# Patient Record
Sex: MALE | Race: WHITE | NOT HISPANIC OR LATINO | Employment: OTHER | URBAN - METROPOLITAN AREA
[De-identification: names, ages, dates, MRNs, and addresses within clinical notes are randomized per-mention and may not be internally consistent; named-entity substitution may affect disease eponyms.]

---

## 2017-02-07 ENCOUNTER — ANTICOAGULATION VISIT (OUTPATIENT)
Dept: CARDIOLOGY | Facility: CLINIC | Age: 72
End: 2017-02-07

## 2017-02-07 LAB — INR PPP: 2.3

## 2017-03-15 ENCOUNTER — ANTICOAGULATION VISIT (OUTPATIENT)
Dept: CARDIOLOGY | Facility: CLINIC | Age: 72
End: 2017-03-15

## 2017-03-15 LAB — INR PPP: 2.1

## 2017-03-17 ENCOUNTER — OFFICE VISIT (OUTPATIENT)
Dept: CARDIOLOGY | Facility: CLINIC | Age: 72
End: 2017-03-17

## 2017-03-17 VITALS
BODY MASS INDEX: 33.69 KG/M2 | WEIGHT: 271 LBS | DIASTOLIC BLOOD PRESSURE: 60 MMHG | HEART RATE: 50 BPM | SYSTOLIC BLOOD PRESSURE: 118 MMHG | HEIGHT: 75 IN

## 2017-03-17 DIAGNOSIS — I50.22 CHRONIC SYSTOLIC CONGESTIVE HEART FAILURE (HCC): ICD-10-CM

## 2017-03-17 DIAGNOSIS — I44.7 LBBB (LEFT BUNDLE BRANCH BLOCK): ICD-10-CM

## 2017-03-17 DIAGNOSIS — I42.9 CARDIOMYOPATHY (HCC): Primary | ICD-10-CM

## 2017-03-17 DIAGNOSIS — Z95.810 ICD (IMPLANTABLE CARDIOVERTER-DEFIBRILLATOR) IN PLACE: ICD-10-CM

## 2017-03-17 DIAGNOSIS — I48.0 PAROXYSMAL ATRIAL FIBRILLATION (HCC): ICD-10-CM

## 2017-03-17 PROCEDURE — 99213 OFFICE O/P EST LOW 20 MIN: CPT | Performed by: PHYSICIAN ASSISTANT

## 2017-03-17 PROCEDURE — 93289 INTERROG DEVICE EVAL HEART: CPT | Performed by: PHYSICIAN ASSISTANT

## 2017-03-17 NOTE — PROGRESS NOTES
Chief Complaint(s): CHF    History of Present Illness:    The acute uncomplicated chief complaint(s) first occurred in years ago,  is chronic in nature, moderate in severity, random in occurrence, happening all the time but controlled on medications, and has been medicated with BB, ARB, and diuretics, and manifest as a feeling of sob. It is worsened with exertion and is relieved with rest.  It is also occurring in the setting of concomitant edema.      Problem   Paroxysmal Atrial Fibrillation    · chadsvasc =4,Chronic coumadin  ·      Cardiomyopathy    -     Chronic Systolic Congestive Heart Failure    -     Icd (Implantable Cardioverter-Defibrillator) in Place    -            Current Outpatient Prescriptions:   •  aspirin 325 MG tablet, Take 325 mg by mouth daily., Disp: , Rfl:   •  carvedilol (COREG) 12.5 MG tablet, Take 12.5 mg by mouth 2 (two) times a day with meals., Disp: , Rfl:   •  digoxin (LANOXIN) 250 MCG tablet, Take 250 mcg by mouth every day., Disp: , Rfl:   •  fenofibrate (TRICOR) 145 MG tablet, Take 145 mg by mouth daily., Disp: , Rfl:   •  furosemide (LASIX) 20 MG tablet, Take 20 mg by mouth daily. Takes 1-2 tablets daily as needed, Disp: , Rfl:   •  gabapentin (NEURONTIN) 300 MG capsule, Take 300 mg by mouth daily., Disp: , Rfl:   •  GLYBURIDE PO, Take 8 mg by mouth daily., Disp: , Rfl:   •  insulin glargine (LANTUS) 100 UNIT/ML injection, Inject 86 Units under the skin daily., Disp: , Rfl:   •  levothyroxine (SYNTHROID, LEVOTHROID) 125 MCG tablet, Take 125 mcg by mouth daily., Disp: , Rfl:   •  losartan (COZAAR) 50 MG tablet, Take 50 mg by mouth daily., Disp: , Rfl:   •  SIMVASTATIN PO, Take 40 mg by mouth daily., Disp: , Rfl:   •  spironolactone (ALDACTONE) 25 MG tablet, Take 25 mg by mouth daily., Disp: , Rfl:   •  warfarin (COUMADIN) 0.25 MG quarter tablet, Take 1 mg by mouth daily. Take as directed, Disp: , Rfl:   No Known Allergies     PFSH:    Denies any alcohol, caffeine or tobacco  "abuse      ROS:    Cardiov: Denies chest pain, tightness, palpitations, GRECO, PND, or edema  Respiratory: Denies dyspnea, cough, hemoptysis, pleuritic chest pain, wheezing or sputum production    Visit Vitals   • /60 (BP Location: Left arm, Patient Position: Sitting)   • Pulse 50   • Ht 75\" (190.5 cm)   • Wt 271 lb (123 kg)   • BMI 33.87 kg/m2        Physical Exam   Pulmonary/Chest:            Lungs: CTA, no wheezing, equal air entry bilaterally, resonant to percussion  Cor: RRR, physiologic S1, S2, no rubs, gallops, murmurs, snaps, clicks, rubs or thrills, PMI non-displaced  Ext: warm, negative edema, all pulses normal     Diagnosis Plan   1. Cardiomyopathy  Continue coreg, losartan, lasix, spironolactone, digoxin   2. Chronic systolic congestive heart failure     3. ICD (implantable cardioverter-defibrillator) in place     4. LBBB (left bundle branch block)     5. Paroxysmal atrial fibrillation  Continue ASA if longer AF in future may need blood thinner          Device Check (BIVICD)    Company MDT  Mode DDD  Lower Rate 50 bpm  Upper rate 130 bpm         Thresholds    % pacing 5  Atrial Pacing 0.9 Volts @ 0.4 ms  Atrial Sensing 1.8 mV  Atrial Impedence 475 Ohms    % pacing 98  Right Ventricular Pacing 0.8 Volts @ 0.4 ms  Right Ventricular Sensing 9.8 mV  Right Ventricular Impedence 361 Ohms    % pacing 98  Left Ventricular Pacing 0.6 Volts @ 0.4 ms  Left Ventricular Sensing - mV  Left Ventricular Impedence 361 Ohms           Tachy Rx    VT1 171- 222 bpm  VT2 - - - bpm  VF > 222 bpm      Battery Voltage 2.91 Volts  Longevity 6.3  Charge Time 3.8 sec  Shock Impedence 47 Ohms    Episodes Short episodes of brief afib, less than twenty minutes    Reprogramming No changes    Comments Stable BIVICD    "

## 2017-04-26 ENCOUNTER — ANTICOAGULATION VISIT (OUTPATIENT)
Dept: CARDIOLOGY | Facility: CLINIC | Age: 72
End: 2017-04-26

## 2017-04-26 LAB — INR PPP: 2.2

## 2017-06-22 ENCOUNTER — ANTICOAGULATION VISIT (OUTPATIENT)
Dept: CARDIOLOGY | Facility: CLINIC | Age: 72
End: 2017-06-22

## 2017-06-22 LAB — INR PPP: 2

## 2017-06-22 NOTE — PATIENT INSTRUCTIONS
To recheck 2 weeks with no changes.  Wife says that she has been out of commission, broke her shoulder and then had an MI and that is why he has not been checked in 2 months.    AH

## 2017-08-01 ENCOUNTER — TELEPHONE (OUTPATIENT)
Dept: CARDIOLOGY | Facility: CLINIC | Age: 72
End: 2017-08-01

## 2017-08-11 ENCOUNTER — ANTICOAGULATION VISIT (OUTPATIENT)
Dept: CARDIOLOGY | Facility: CLINIC | Age: 72
End: 2017-08-11

## 2017-08-11 LAB — INR PPP: 2.4

## 2017-09-19 ENCOUNTER — ANTICOAGULATION VISIT (OUTPATIENT)
Dept: PHARMACY | Facility: HOSPITAL | Age: 72
End: 2017-09-19

## 2017-09-19 DIAGNOSIS — I48.0 PAROXYSMAL ATRIAL FIBRILLATION (HCC): ICD-10-CM

## 2017-09-19 LAB — INR PPP: 2.5

## 2017-09-19 NOTE — PROGRESS NOTES
Anticoagulation Clinic - Remote Progress Note  HOME MONITOR  Frequency of Monitoring: every 4 weeks    Indication: afib   Referring Provider: MARCEL Bryan  Goal INR: 2.0-3.0  Current Drug Interactions: aspirin, fenofibrate, glyburide, levothyroxine, spironolactone  CHADS-VASc: 3 (age, DM, CHF)    Diet: wife says a salad a week, green beans sometimes  Alcohol: none  Tobacco: none  OTC Pain Medication:     INR History:  Date 8/11 9/19          Total Weekly Dose 40mg 40mg          INR 2.4 2.5          Notes  1st of clinic            Phone Interview:  Tablet Strength: pt has 7.5mg and 5mg tablets  Current Maintenance Dose: 7.5 mg on Sun, Thu; 5 mg all other days  Patient Findings      Negatives Signs/symptoms of thrombosis, Signs/symptoms of bleeding, Laboratory test error suspected, Change in health, Change in alcohol use, Change in activity, Upcoming invasive procedure, Emergency department visit, Upcoming dental procedure, Missed doses, Extra doses, Change in medications, Change in diet/appetite, Hospital admission, Bruising, Other complaints         Patient Contact Info: [phone] **preferred**  Aleta(wife) is point of contact, her cell [phone]  Lab Contact Info: Joanna, [phone]    Plan:  1. INR is therapeutic (2.5). Instructed pt to continue maintenance dose of 7.5 mg on Sun, Thu; 5 mg all other days.  2. Repeat INR in 4 weeks.  3. Pt declines warfarin refills. Dr Jewel Phillip usually refills warfarin.  4. Verbal information provided over the phone to Aleta, pt's wife. She RBV dosing instructions, expresses understanding, and has no further questions at this time.    Keaton Perez  9/19/2017  12:07 PM      ISherrie, [facility], have reviewed the note in full and agree with the assessment and plan.  09/20/17  9:01 AM

## 2017-09-25 ENCOUNTER — TELEPHONE (OUTPATIENT)
Dept: CARDIOLOGY | Facility: CLINIC | Age: 72
End: 2017-09-25

## 2017-10-18 ENCOUNTER — TELEPHONE (OUTPATIENT)
Dept: CARDIOLOGY | Facility: CLINIC | Age: 72
End: 2017-10-18

## 2017-10-18 NOTE — TELEPHONE ENCOUNTER
Patient called to let us know that his PCP has been checking Digoxin levels on him for the past 2 weeks. He said that they have been too high and his PCP has decreased his dose b/c he was feeling bad. He said that his HR was in the 40's today. He also said that his blood sugar has been much better just being on a lower dose of Digoxin. He is going to schedule an appointment with the A-fib clinic to see if he needs his medications adjusted.

## 2017-11-06 ENCOUNTER — TELEPHONE (OUTPATIENT)
Dept: PHARMACY | Facility: HOSPITAL | Age: 72
End: 2017-11-06

## 2017-11-07 LAB — INR PPP: 2.1

## 2017-11-09 ENCOUNTER — DOCUMENTATION (OUTPATIENT)
Dept: CARDIOLOGY | Facility: CLINIC | Age: 72
End: 2017-11-09

## 2017-11-09 ENCOUNTER — ANTICOAGULATION VISIT (OUTPATIENT)
Dept: PHARMACY | Facility: HOSPITAL | Age: 72
End: 2017-11-09

## 2017-11-09 DIAGNOSIS — I48.0 PAROXYSMAL ATRIAL FIBRILLATION (HCC): ICD-10-CM

## 2017-11-09 NOTE — PROGRESS NOTES
Anticoagulation Clinic - Remote Progress Note  HOME MONITOR  Frequency of Monitoring: every 4 weeks    Indication: afib   Referring Provider: MARCEL Bryan  Goal INR: 2.0-3.0  Current Drug Interactions: aspirin, fenofibrate, glyburide, levothyroxine, spironolactone  CHADS-VASc: 3 (age, DM, CHF)    Diet: wife says a salad a week, green beans sometimes  Alcohol: none  Tobacco: none  OTC Pain Medication: APAP prn     INR History:  Date 8/11 9/19 11/7         Total Weekly Dose 40mg 40mg 40mg         INR 2.4 2.5 2.1         Notes  1st of clinic            Phone Interview:  Tablet Strength: pt has 7.5mg and 5mg tablets  Current Maintenance Dose: 5 mg daily except for 7.5mg on SunThurs  Patient Findings      Positives Change in medications     Negatives Signs/symptoms of thrombosis, Signs/symptoms of bleeding, Laboratory test error suspected, Change in health, Change in alcohol use, Change in activity, Upcoming invasive procedure, Emergency department visit, Upcoming dental procedure, Missed doses, Extra doses, Change in diet/appetite, Hospital admission, Bruising, Other complaints     Comments Wife reports pt is no longer taking digoxin     Patient Contact Info: [phone] **preferred**  Aleta (wife) is point of contact, her cell [phone]  Lab Contact Info: Joanna [phone]    Plan:  1. INR is therapeutic (2.1). Instructed pt to continue maintenance dose of 5 mg daily except for 7.5mg on SunThurs.  2. Repeat INR in 4 weeks.  3. Pt declines warfarin refills. Dr Jewel Phillip usually refills warfarin.  4. Verbal information provided over the phone to Aleta, pt's wife. She RBV dosing instructions, expresses understanding, and has no further questions at this time.    Keaton Perez, Ozzie  11/9/2017  8:34 AM       I, Suzie Stephenson, PharmD, assisted in the patient interview. I have reviewed the note in full and agree with the assessment and plan.  11/10/17  1:16 PM

## 2017-12-12 ENCOUNTER — TELEPHONE (OUTPATIENT)
Dept: PHARMACY | Facility: HOSPITAL | Age: 72
End: 2017-12-12

## 2017-12-13 ENCOUNTER — ANTICOAGULATION VISIT (OUTPATIENT)
Dept: PHARMACY | Facility: HOSPITAL | Age: 72
End: 2017-12-13

## 2017-12-13 DIAGNOSIS — I48.0 PAROXYSMAL ATRIAL FIBRILLATION (HCC): ICD-10-CM

## 2017-12-13 LAB — INR PPP: 2.3

## 2017-12-13 NOTE — PROGRESS NOTES
Anticoagulation Clinic - Remote Progress Note  HOME MONITOR  Frequency of Monitoring: every 4 weeks    Indication: afib   Referring Provider: MARCEL Bryan  Goal INR: 2.0-3.0  Current Drug Interactions: aspirin, fenofibrate, glyburide, levothyroxine, spironolactone  CHADS-VASc: 3 (age, DM, CHF)    Diet: wife says a salad a week, green beans sometimes  Alcohol: none  Tobacco: none  OTC Pain Medication: APAP prn     INR History:  Date 8/11 9/19 11/7 12/13        Total Weekly Dose 40mg 40mg 40mg 40mg 40mg       INR 2.4 2.5 2.1 2.3        Notes  1st of clinic            Phone Interview:  Tablet Strength: pt has 7.5mg and 5mg tablets  Current Maintenance Dose: 5mg daily except for 7.5mg on SunThurs    Patient Findings      Positives Emergency department visit     Negatives Signs/symptoms of thrombosis, Signs/symptoms of bleeding, Laboratory test error suspected, Change in health, Change in alcohol use, Change in activity, Upcoming invasive procedure, Upcoming dental procedure, Missed doses, Extra doses, Change in medications, Change in diet/appetite, Hospital admission, Bruising, Other complaints     Comments ED visit Mon 12/10 at  for CHF/shortness of breath. Wife said they just let him know what was going on and discharged him. No changes in meds otherwise     Patient Contact Info: [phone] **preferred**  Aleta (wife) is point of contact, her cell [phone]  Lab Contact Info: Joanna [phone]    Plan:  1. INR is therapeutic (2.3). Instructed pt to continue maintenance dose of 5mg daily except for 7.5mg on SunThurs.  2. Repeat INR in 4 weeks.  3. Pt declines warfarin refills. Dr Jewel Phillip usually refills warfarin.  4. Verbal information provided over the phone. Pt RBV dosing instructions, expresses understanding by teach back, and has no further questions at this time.    Keaton Perez, Ozzie  12/13/2017  2:37 PM     ISuzy, [facility], have reviewed the note in full and agree with the  assessment and plan.  12/14/17  8:29 AM

## 2018-01-28 ENCOUNTER — TELEPHONE (OUTPATIENT)
Dept: PHARMACY | Facility: HOSPITAL | Age: 73
End: 2018-01-28

## 2018-01-28 NOTE — TELEPHONE ENCOUNTER
Patient was due to repeat  INR on 1/10. Patient's wife stated that she would have him test today or tomorrow.

## 2018-01-31 ENCOUNTER — ANTICOAGULATION VISIT (OUTPATIENT)
Dept: PHARMACY | Facility: HOSPITAL | Age: 73
End: 2018-01-31

## 2018-01-31 ENCOUNTER — TELEPHONE (OUTPATIENT)
Dept: CARDIOLOGY | Facility: CLINIC | Age: 73
End: 2018-01-31

## 2018-01-31 DIAGNOSIS — I48.0 PAROXYSMAL ATRIAL FIBRILLATION (HCC): ICD-10-CM

## 2018-01-31 LAB — INR PPP: 2.5

## 2018-02-05 RX ORDER — SULINDAC 150 MG/1
150 TABLET ORAL AS NEEDED
COMMUNITY

## 2018-02-05 RX ORDER — ERGOCALCIFEROL 1.25 MG/1
50000 CAPSULE ORAL WEEKLY
COMMUNITY

## 2018-02-05 RX ORDER — HYDROCODONE BITARTRATE AND ACETAMINOPHEN 7.5; 325 MG/1; MG/1
1 TABLET ORAL 4 TIMES DAILY
COMMUNITY

## 2018-02-05 NOTE — PROGRESS NOTES
I, Suzie Stephenson, PharmD, have reviewed the note in full and agree with the assessment and plan.  02/05/18  8:57 AM

## 2018-03-06 ENCOUNTER — TELEPHONE (OUTPATIENT)
Dept: PHARMACY | Facility: HOSPITAL | Age: 73
End: 2018-03-06

## 2018-03-06 LAB — INR PPP: 2.1

## 2018-03-07 ENCOUNTER — ANTICOAGULATION VISIT (OUTPATIENT)
Dept: PHARMACY | Facility: HOSPITAL | Age: 73
End: 2018-03-07

## 2018-03-07 DIAGNOSIS — I48.0 PAROXYSMAL ATRIAL FIBRILLATION (HCC): ICD-10-CM

## 2018-03-07 NOTE — PROGRESS NOTES
Anticoagulation Clinic - Remote Progress Note  [facility] HOME MONITOR  Frequency of Monitoring: every 4 weeks    Indication: afib   Referring Provider: MARCEL Bryan  Goal INR: 2.0-3.0  Current Drug Interactions: aspirin, fenofibrate, glyburide, levothyroxine, spironolactone  CHADS-VASc: 3 (age, DM, CHF)    Diet: wife says a salad a week, green beans sometimes  Alcohol: none  Tobacco: none  OTC Pain Medication: APAP prn     INR History:  Date 8/11 9/19 11/7 12/13 1/31 3/6      Total Weekly Dose 40mg 40mg 40mg 40mg 40mg 40mg      INR 2.4 2.5 2.1 2.3 2.5 2.1      Notes  1st of clinic            Phone Interview:  Tablet Strength: pt has 7.5mg and 5mg tablets  Current Maintenance Dose: 5mg daily except for 7.5mg on SunThurs  Patient Contact Info: [phone] **preferred**  Aleta (wife) is point of contact, her cell [phone]  Patient Findings      Negatives Signs/symptoms of thrombosis, Signs/symptoms of bleeding, Laboratory test error suspected, Change in health, Change in alcohol use, Change in activity, Upcoming invasive procedure, Emergency department visit, Upcoming dental procedure, Missed doses, Extra doses, Change in medications, Change in diet/appetite, Hospital admission, Bruising, Other complaints     Plan:  1. INR was therapeutic on 3/6 at 2.1. Instructed patient's wife, Aleta, to have patient continue maintenance dose of warfarin 5mg daily except for 7.5mg on SunThurs.  2. Repeat INR in 3 weeks.  3. Pt declines warfarin refills. Dr Jewel Phillip usually refills warfarin.  4. Verbal information provided over the phone to patient's wife, Aleta, who RBV dosing instructions, expresses understanding by teach back, and has no further questions at this time.    Maribel Painting CPhT  3/7/2018  10:34 AM     ISherrie, [facility], have reviewed the note in full and agree with the assessment and plan.  03/07/18  1:07 PM

## 2018-04-16 ENCOUNTER — TELEPHONE (OUTPATIENT)
Dept: PHARMACY | Facility: HOSPITAL | Age: 73
End: 2018-04-16

## 2018-04-23 ENCOUNTER — ANTICOAGULATION VISIT (OUTPATIENT)
Dept: PHARMACY | Facility: HOSPITAL | Age: 73
End: 2018-04-23

## 2018-04-23 DIAGNOSIS — I48.0 PAROXYSMAL ATRIAL FIBRILLATION (HCC): ICD-10-CM

## 2018-04-23 LAB — INR PPP: 2.6

## 2018-04-23 NOTE — PROGRESS NOTES
Anticoagulation Clinic - Remote Progress Note  [facility] MONIT[facility]  Frequency of Monitoring: every 4 weeks    Indication: afib   Referring Provider: MARCEL Bryan  Goal INR: 2.0-3.0  Current Drug Interactions: aspirin, fenofibrate, glyburide, levothyroxine, spironolactone  CHADS-VASc: 3 (age, DM, CHF)    Diet: wife says a salad a week, green beans sometimes  Alcohol: none  Tobacco: none  OTC Pain Medication: APAP prn     INR History:  Date 8/11 9/19 11/7 12/13 1/31 3/6 4/23     Total Weekly Dose 40mg 40mg 40mg 40mg 40mg 40mg 40mg     INR 2.4 2.5 2.1 2.3 2.5 2.1 2.6     Notes  1st of clinic            Phone Interview:  Tablet Strength: pt has 7.5mg and 5mg tablets  Current Maintenance Dose: 5mg daily except for 7.5mg on SunThurs  Patient Contact Info: [phone] **preferred**  Aleta (wife) is point of contact, her cell [phone]    Patient Findings:   Negatives Signs/symptoms of thrombosis, Signs/symptoms of bleeding, Laboratory test error suspected, Change in health, Change in alcohol use, Change in activity, Upcoming invasive procedure, Emergency department visit, Upcoming dental procedure, Missed doses, Extra doses, Change in medications, Change in diet/appetite, Hospital admission, Bruising, Other complaints     Plan:  1. INR is therapeutic today at 2.6. Instructed patient's wife, Aleta, to have patient continue maintenance dose of warfarin 5mg daily except for 7.5mg on SunThurs.  2. Repeat INR in 4 weeks. (5/21)  3. Pt declines warfarin refills. Dr Jewel Phillip usually refills warfarin.  4. Verbal information provided over the phone to patient's wife, Aleta, who RBV dosing instructions, expresses understanding by teach back, and has no further questions at this time.    Maribel Painting CPhT  4/23/2018  1:36 PM     IKeaton [facility], have reviewed the note in full and agree with the assessment and plan.  04/23/18  1:52 PM

## 2018-04-23 NOTE — TELEPHONE ENCOUNTER
Spoke with patient about overdue PT/INR self test. He said he would take care of it immediately and call it into [facility]

## 2018-05-29 ENCOUNTER — TELEPHONE (OUTPATIENT)
Dept: PHARMACY | Facility: HOSPITAL | Age: 73
End: 2018-05-29

## 2018-05-31 ENCOUNTER — ANTICOAGULATION VISIT (OUTPATIENT)
Dept: PHARMACY | Facility: HOSPITAL | Age: 73
End: 2018-05-31

## 2018-05-31 DIAGNOSIS — I48.0 PAROXYSMAL ATRIAL FIBRILLATION (HCC): ICD-10-CM

## 2018-05-31 LAB — INR PPP: 2.7

## 2018-07-09 ENCOUNTER — TELEPHONE (OUTPATIENT)
Dept: CARDIOLOGY | Facility: CLINIC | Age: 73
End: 2018-07-09

## 2018-07-09 NOTE — TELEPHONE ENCOUNTER
Patient called and left a message. I tried to call him back. No answer. It would not let me leave a message.

## 2018-07-10 ENCOUNTER — ANTICOAGULATION VISIT (OUTPATIENT)
Dept: PHARMACY | Facility: HOSPITAL | Age: 73
End: 2018-07-10

## 2018-07-10 DIAGNOSIS — I48.0 PAROXYSMAL ATRIAL FIBRILLATION (HCC): ICD-10-CM

## 2018-07-10 LAB
INR PPP: 1.1
INR PPP: 1.1

## 2018-07-10 NOTE — PROGRESS NOTES
Anticoagulation Clinic - Remote Progress Note  [facility] MONIT[facility]  Frequency of Monitoring: every 4 weeks    Indication: afib   Referring Provider: MARCEL Bryan, Dr Holt  Goal INR: 2.0-3.0  Current Drug Interactions: aspirin, fenofibrate, glyburide, levothyroxine, spironolactone  CHADS-VASc: 3 (age, DM, CHF)    Diet: wife says a salad a week, green beans sometimes  Alcohol: none  Tobacco: none  OTC Pain Medication: APAP prn     INR History:  Date 8/11 9/19 11/7 12/13 1/31/18 3/6 4/23 5/31 7/10   Total Weekly Dose 40mg 40mg 40mg 40mg 40mg 40mg 40mg 40mg    INR 2.4 2.5 2.1 2.3 2.5 2.1 2.6 2.7 1.1   Notes  1st of clinic      missed 1x dose      Phone Interview:  Tablet Strength: 5mg and 7.5mg tablets  Current Maintenance Dose: 5mg daily except for 7.5mg on SunThurs  Patient Contact Info: [phone] **preferred**  Aleta (wife) is point of contact, her cell [phone]    Patient Findings   Positives:   Missed doses   Negatives:   Signs/symptoms of thrombosis, Signs/symptoms of bleeding, Laboratory test error suspected, Change in health, Change in alcohol use, Change in activity, Upcoming invasive procedure, Emergency department visit, Upcoming dental procedure, Extra doses, Change in medications, Change in diet/appetite, Hospital admission, Bruising, Other complaints   Comments:   Missed Tues or Wed last week. He had a tooth pull last Monday and reports that he has been bleeding from mouth. Suggested trying to put a tea bag on mouth.      Plan:  1. INR is SUB therapeutic today at 1.1. Patient had been due to check INR 6/12; however, have not had any INR reported to clinic since 5/31.        Instructed patient's wife, Aleta, to have patient take warfarin 7.5 mg daily until recheck on Friday 7/13. Will likely resume warfarin 5mg daily except for 7.5mg on SunThurs.   Note:  Dr. Jason (PCP [facility]) Wrote Lovenox Rx - however, patient refuses to take them at this time given bleeding from mouth. He voices  understanding of risks associated with SUBtherapeutic INR.   2. Repeat INR on 7/13.  3. Verbal information provided over the phone. Pt's wife, Aleta, RBV dosing instructions, expresses understanding by teach back, and has no further questions at this time.  4. Pt declines warfarin refills. Dr Jewel Phillip usually refills warfarin.    Ozzie Ahumada, PharmD  7/10/2018  10:34 AM    I, Suzie Stephenson, PharmD, have reviewed the note in full and agree with the assessment and plan.  07/10/18  12:38 PM

## 2018-07-13 ENCOUNTER — ANTICOAGULATION VISIT (OUTPATIENT)
Dept: PHARMACY | Facility: HOSPITAL | Age: 73
End: 2018-07-13

## 2018-07-13 DIAGNOSIS — I48.0 PAROXYSMAL ATRIAL FIBRILLATION (HCC): ICD-10-CM

## 2018-07-13 LAB — INR PPP: 1.5

## 2018-07-13 NOTE — PROGRESS NOTES
Anticoagulation Clinic - Remote Progress Note  [facility]  Frequency of Monitoring: every 4 weeks    Indication: afib   Referring Provider: MARCEL Bryan, Dr Holt  Goal INR: 2.0-3.0  Current Drug Interactions: aspirin, fenofibrate, glyburide, levothyroxine, spironolactone  CHADS-VASc: 3 (age, DM, CHF)    Diet: wife says a salad a week, green beans sometimes  Alcohol: none  Tobacco: none  OTC Pain Medication: APAP prn     INR History:  Date 8/11 9/19 11/7 12/13 1/31/18 3/6 4/23 5/31 7/10   Total Weekly Dose 40mg 40mg 40mg 40mg 40mg 40mg 40mg 40mg 15mg??   INR 2.4 2.5 2.1 2.3 2.5 2.1 2.6 2.7 1.1   Notes  1st of clinic      missed 1x dose amox     Date 7/13           Total Weekly Dose 27.5mg 47.5 mg          INR 1.5           Notes amox             Phone Interview:  Tablet Strength: 5mg and 7.5mg tablets  Current Maintenance Dose: 5mg daily except for 7.5mg on SunThurs  Patient Contact Info: [phone] **preferred**  Aleta (wife) is point of contact, her cell [phone]    Patient Findings   Positives:   Change in medications   Negatives:   Signs/symptoms of thrombosis, Signs/symptoms of bleeding, Laboratory test error suspected, Change in health, Change in alcohol use, Change in activity, Upcoming invasive procedure, Emergency department visit, Upcoming dental procedure, Missed doses, Extra doses, Change in diet/appetite, Hospital admission, Bruising, Other complaints   Comments:   Patient had teeth pulled on 7/2. He held warfarin perioperatively and was using Lovenox shots (prescribed by Dr. Jason PCP in [facility]). He developed bleeding in his mouth and subsequently stopped the Lovenox shots on his own. Pt's wife reports he has not been using the Lovenox shots and the bleeding in his mouth has resolved.     Patient was prescribed amoxicillin 500 mg PO BID at the time his teeth were extracted. Pt's wife reports he has one more day of amoxicillin course.      Plan:  1. INR is SUBtherapeutic again  today at 1.5, up from 1.1 on 7/10. INR is subtherapeutic due to held warfarin doses for teeth extraction. Instructed patient's wife, Aleta, to have patient BOOST with warfarin 7.5 mg tonight, then continue warfarin 5mg daily except for 7.5mg on SunThurs (targeting 47.5mg/week, 18% increase in dose). Will likely return to maintenance dose of 5mg daily except for 7.5mg on SunThurs at next INR check as patient has been therapeutic on this dose in the past.    2. Repeat INR on 7/16.  3. Verbal information provided over the phone. Pt's wife, Aleta, RBV dosing instructions, expresses understanding by teach back, and has no further questions at this time.  4. Pt declines warfarin refills. Dr Jewel Phillip usually refills warfarin.    Suzy Kaiser, PharmD  Pharmacy Resident  7/13/2018  2:54 PM

## 2018-07-17 ENCOUNTER — ANTICOAGULATION VISIT (OUTPATIENT)
Dept: PHARMACY | Facility: HOSPITAL | Age: 73
End: 2018-07-17

## 2018-07-17 DIAGNOSIS — I48.0 PAROXYSMAL ATRIAL FIBRILLATION (HCC): ICD-10-CM

## 2018-07-17 LAB — INR PPP: 2.3

## 2018-07-17 NOTE — PROGRESS NOTES
Anticoagulation Clinic - Remote Progress Note  [facility]  Frequency of Monitoring: every 4 weeks    Indication: paroxysmal afib   Referring Provider: MARCEL Bryan, Dr Holt  Goal INR: 2.0-3.0  Current Drug Interactions: aspirin, fenofibrate, glyburide, levothyroxine, spironolactone  CHADS-VASc: 3 (age, DM, CHF)    Diet: wife says a salad a week, green beans sometimes  Alcohol: none  Tobacco: none  OTC Pain Medication: APAP prn     INR History:  Date 8/11 9/19 11/7 12/13 1/31/18 3/6 4/23 5/31 7/10   Total Weekly Dose 40mg 40mg 40mg 40mg 40mg 40mg 40mg 40mg 15mg??   INR 2.4 2.5 2.1 2.3 2.5 2.1 2.6 2.7 1.1   Notes  1st of clinic      missed 1x dose amox     Date 7/13 7/17          Total Weekly Dose 27.5mg 47.5 mg          INR 1.5 2.3          Notes amox boost            Phone Interview:  Tablet Strength: 5mg and 7.5mg tablets  Current Maintenance Dose: 5mg daily except for 7.5mg on SunThurs  Patient Contact Info: [phone] **preferred**  Aleta (wife) is point of contact, her cell [phone]    Patient Findings   Negatives:   Signs/symptoms of thrombosis, Signs/symptoms of bleeding, Laboratory test error suspected, Change in health, Change in alcohol use, Change in activity, Upcoming invasive procedure, Emergency department visit, Upcoming dental procedure, Missed doses, Extra doses, Change in medications, Change in diet/appetite, Hospital admission, Bruising, Other complaints     Plan:  1. INR is therapeutic and back WNL today at 2.3. Instructed patient's wife to have patient to continue warfarin 5mg daily except for 7.5mg on SunThurs  2. Repeat INR in 1 week, 7/24 to make sure patient stays WNL  3. Verbal information provided over the phone. Pt's wife, Aleta, RBV dosing instructions, expresses understanding by teach back, and has no further questions at this time.  4. Pt declines warfarin refills. Dr Jewel Phillip usually refills warfarin.    Keaton Perez, Ozzie  7/17/2018  12:05 PM     I  Sylvie Kaiser, [facility], have reviewed the note in full and agree with the assessment and plan.  07/17/18  1:10 PM

## 2018-07-23 ENCOUNTER — ANTICOAGULATION VISIT (OUTPATIENT)
Dept: PHARMACY | Facility: HOSPITAL | Age: 73
End: 2018-07-23

## 2018-07-23 DIAGNOSIS — I48.0 PAROXYSMAL ATRIAL FIBRILLATION (HCC): ICD-10-CM

## 2018-07-23 LAB — INR PPP: 2.8

## 2018-07-23 NOTE — PROGRESS NOTES
Anticoagulation Clinic - Remote Progress Note  [facility]  Frequency of Monitoring: every 4 weeks    Indication: paroxysmal afib   Referring Provider: MARCEL Bryan, Dr Holt  Goal INR: 2.0-3.0  Current Drug Interactions: aspirin, fenofibrate, glyburide, levothyroxine, spironolactone  CHADS-VASc: 3 (age, DM, CHF)    Diet: wife says a salad a week, green beans sometimes  Alcohol: none  Tobacco: none  OTC Pain Medication: APAP prn     INR History:  Date 8/11 9/19 11/7 12/13 1/31/18 3/6 4/23 5/31 7/10   Total Weekly Dose 40mg 40mg 40mg 40mg 40mg 40mg 40mg 40mg 15mg??   INR 2.4 2.5 2.1 2.3 2.5 2.1 2.6 2.7 1.1   Notes  1st of clinic      missed 1x dose amox     Date 7/13 7/17 7/23         Total Weekly Dose 27.5mg 47.5 mg 35 mg         INR 1.5 2.3 2.8         Notes amox boost            Phone Interview:  Tablet Strength: 5mg and 7.5mg tablets  Current Maintenance Dose: 5mg daily except for 7.5mg on SunThurs  Patient Contact Info: [phone] **preferred**  Aleta (wife) is point of contact, her cell [phone]    Patient Findings:  Positives:   Missed doses, Bruising   Negatives:   Signs/symptoms of thrombosis, Signs/symptoms of bleeding, Laboratory test error suspected, Change in health, Change in alcohol use, Change in activity, Upcoming invasive procedure, Emergency department visit, Upcoming dental procedure, Extra doses, Change in medications, Change in diet/appetite, Hospital admission, Other complaints   Comments:   Patient has been having lots of bruising per patient spouse. They were concerned about this and only took 2.5mg last night instead of the scheduled 7.5mg     Plan:  1. INR is therapeutic today at 2.8. Instructed patient's wife to have patient continue warfarin 5mg daily except for 7.5mg on SunThurs.  2. Repeat INR on Thursday per Aleta's preference (7/26).  Will evaluate need for dose adjustment at that time. (12.5% self-reduction in dose last week)  3. Verbal information provided over  the phone. Patient's wife, Aleta, RBV dosing instructions, expresses understanding by teach back, and has no further questions at this time.  4. Patient declines warfarin refills. Dr. Jewel Phillip usually refills warfarin.    Jennie Parekh, Ozzie  7/23/2018  2:23 PM     I, Yomaira Briscoe, CelestinaD, have reviewed the note in full and agree with the assessment and plan.  07/23/18  3:17 PM

## 2018-07-27 ENCOUNTER — ANTICOAGULATION VISIT (OUTPATIENT)
Dept: PHARMACY | Facility: HOSPITAL | Age: 73
End: 2018-07-27

## 2018-07-27 DIAGNOSIS — I48.0 PAROXYSMAL ATRIAL FIBRILLATION (HCC): ICD-10-CM

## 2018-07-27 LAB — INR PPP: 2.3

## 2018-08-27 ENCOUNTER — ANTICOAGULATION VISIT (OUTPATIENT)
Dept: PHARMACY | Facility: HOSPITAL | Age: 73
End: 2018-08-27

## 2018-08-27 DIAGNOSIS — I48.0 PAROXYSMAL ATRIAL FIBRILLATION (HCC): ICD-10-CM

## 2018-08-27 LAB — INR PPP: 3.2

## 2018-08-27 NOTE — PROGRESS NOTES
Anticoagulation Clinic - Remote Progress Note  [facility]  Frequency of Monitoring: every 4 weeks    Indication: paroxysmal afib   Referring Provider: MARCEL Bryan, Dr Holt  Goal INR: 2.0-3.0  Current Drug Interactions: aspirin, fenofibrate, glyburide, levothyroxine, spironolactone  CHADS-VASc: 3 (age, DM, CHF)    Diet: wife says a salad a week, green beans sometimes  Alcohol: none  Tobacco: none  OTC Pain Medication: APAP prn     INR History:  Date 8/11 9/19 11/7 12/13 1/31/18 3/6 4/23 5/31 7/10   Total Weekly Dose 40mg 40mg 40mg 40mg 40mg 40mg 40mg 40mg 15mg??   INR 2.4 2.5 2.1 2.3 2.5 2.1 2.6 2.7 1.1   Notes  1st of clinic      missed 1x dose amox     Date 7/13 7/17 7/23 7/27 8/27       Total Weekly Dose 27.5mg 47.5 mg 35mg 35mg 40mg       INR 1.5 2.3 2.8 2.3 3.2       Notes amox boost Self adj           Phone Interview:  Tablet Strength: 5mg and 7.5mg tablets  Current Maintenance Dose: 5mg daily except for 7.5mg on SunThurs  Patient Contact Info: [phone] **preferred**  Aleta (wife) is point of contact, her cell [phone]    Patient Findings:  Positives:   Bruising   Negatives:   Signs/symptoms of thrombosis, Signs/symptoms of bleeding, Laboratory test error suspected, Change in health, Change in alcohol use, Change in activity, Upcoming invasive procedure, Emergency department visit, Upcoming dental procedure, Missed doses, Extra doses, Change in medications, Change in diet/appetite, Hospital admission, Other complaints   Comments:   Patient states he has had some bruising on his arms. Of note, about 3 weeks ago he was taken off of warfarin to have several teeth extracted. He was put on Lovenox shots but began having trouble bleeding from his gums and had to be taken off the shots.     Plan:  1. INR is slightly SUPRAtherapeutic today at 3.2. Instructed patient's wife to have patient eat a serving of GLV and continue warfarin 5mg daily except for 7.5mg on SunThurs.  2. Repeat INR in 1  week, 9/4.  3. Verbal information provided over the phone. Patient's wife, Aleta, RBV dosing instructions, expresses understanding by teach back, and has no further questions at this time.  4. Patient declines warfarin refills. Dr. Jewel Phillip usually refills warfarin.    Jennie Parekh, JASSI  8/27/2018  12:02 PM    Consider dose decrease to 37.5mg/ week if patient returns SUPRAtherapeutic. Will continue with maintenance dose at this point given history of stability   I, Suzie Stephenson, PharmD, have reviewed the note in full and agree with the assessment and plan.  08/29/18  9:01 AM

## 2018-09-07 ENCOUNTER — ANTICOAGULATION VISIT (OUTPATIENT)
Dept: PHARMACY | Facility: HOSPITAL | Age: 73
End: 2018-09-07

## 2018-09-07 DIAGNOSIS — I48.0 PAROXYSMAL ATRIAL FIBRILLATION (HCC): ICD-10-CM

## 2018-09-07 LAB — INR PPP: 3.8

## 2018-09-07 NOTE — PROGRESS NOTES
Anticoagulation Clinic - Remote Progress Note  [facility]  Frequency of Monitoring: every 4 weeks    Indication: paroxysmal afib   Referring Provider: MARCEL Bryan, Dr Holt  Goal INR: 2.0-3.0  Current Drug Interactions: aspirin, fenofibrate, glyburide, levothyroxine, spironolactone  CHADS-VASc: 3 (age, DM, CHF)    Diet: wife says a salad a week, green beans sometimes  Alcohol: none  Tobacco: none  OTC Pain Medication: APAP prn     INR History:  Date 8/11 9/19 11/7 12/13 1/31/18 3/6 4/23 5/31 7/10   Total Weekly Dose 40mg 40mg 40mg 40mg 40mg 40mg 40mg 40mg 15mg??   INR 2.4 2.5 2.1 2.3 2.5 2.1 2.6 2.7 1.1   Notes  1st of clinic      missed 1x dose amox     Date 7/13 7/17 7/23 7/27 8/27 9/7      Total Weekly Dose 27.5mg 47.5 mg 35mg 35mg 40mg 40mg      INR 1.5 2.3 2.8 2.3 3.2 3.8      Notes amox boost Self adj   Lost 20lbs        Phone Interview:  Tablet Strength: 5mg and 7.5mg tablets  Current Maintenance Dose: 5mg daily except for 7.5mg on SunThurs  Patient Contact Info: [phone] **preferred**  Aleta (wife) is point of contact, her cell [phone]    Patient Findings   Negatives:   Signs/symptoms of thrombosis, Signs/symptoms of bleeding, Laboratory test error suspected, Change in health, Change in alcohol use, Change in activity, Upcoming invasive procedure, Emergency department visit, Upcoming dental procedure, Missed doses, Extra doses, Change in medications, Change in diet/appetite, Hospital admission, Bruising, Other complaints   Comments:   Bruises last month on his arms but they have healed per Mrs. Johnson. Mrs. Johnson reports that he may be eating a little less since he had the tooth pull, otherwise, denies all patient findings to be negative at this time. He has stopped drinking diet pepsi and has now had water instead. He is now avoiding salt x 3 months. Mrs. Johnson reports that he has lost a few pounds (~20lbs).      Plan:  1. INR is SUPRAtherapeutic today at 3.8. With recent weight loss,  will instructed patient's wife to have patient decrease warfarin 5mg daily except for 7.5mg on SunThurs.  2. Repeat INR in 1 week, 9/14.  3. Verbal information provided over the phone. Patient's wife, Aleta, RBV dosing instructions, expresses understanding by teach back, and has no further questions at this time.      Suzie Stephenson, PharmD  9/7/2018  3:44 PM

## 2018-10-09 ENCOUNTER — TELEPHONE (OUTPATIENT)
Dept: PHARMACY | Facility: HOSPITAL | Age: 73
End: 2018-10-09

## 2018-10-16 LAB — INR PPP: 3.3

## 2018-10-18 ENCOUNTER — ANTICOAGULATION VISIT (OUTPATIENT)
Dept: PHARMACY | Facility: HOSPITAL | Age: 73
End: 2018-10-18

## 2018-10-18 DIAGNOSIS — I48.0 PAROXYSMAL ATRIAL FIBRILLATION (HCC): ICD-10-CM

## 2018-10-18 NOTE — PROGRESS NOTES
Anticoagulation Clinic - Remote Progress Note  [facility]  Frequency of Monitoring: every 4 weeks    Indication: paroxysmal afib   Referring Provider: MARCEL Bryan, Dr Holt  Goal INR: 2.0-3.0  Current Drug Interactions: aspirin, fenofibrate, glyburide, levothyroxine, spironolactone  CHADS-VASc: 3 (age, DM, CHF)    Diet: wife says a salad a week, green beans sometimes  Alcohol: none  Tobacco: none  OTC Pain Medication: APAP prn     INR History:  Date 8/11 9/19 11/7 12/13 1/31/18 3/6 4/23 5/31 7/10   Total Weekly Dose 40mg 40mg 40mg 40mg 40mg 40mg 40mg 40mg 15mg??   INR 2.4 2.5 2.1 2.3 2.5 2.1 2.6 2.7 1.1   Notes  1st of clinic      missed 1x dose amox     Date 7/13 7/17 7/23 7/27 8/27 9/7 10/16     Total Weekly Dose 27.5mg 47.5 mg 35mg 35mg 40mg 40mg 37.5  mg 35mg    INR 1.5 2.3 2.8 2.3 3.2 3.8 3.3     Notes amox boost Self adj   Lost 20lbs        Phone Interview:  Tablet Strength: 5mg and 7.5mg tablets  Current Maintenance Dose: 5mg daily except for 7.5mg on SunThurs  Patient Contact Info: [phone] **preferred**  Aleta (wife) is point of contact, her cell [phone]  Lab Contact Info: [facility]    Patient Findings   Negatives:   Signs/symptoms of thrombosis, Signs/symptoms of bleeding, Laboratory test error suspected, Change in health, Change in alcohol use, Change in activity, Upcoming invasive procedure, Emergency department visit, Upcoming dental procedure, Missed doses, Extra doses, Change in medications, Change in diet/appetite, Hospital admission, Bruising, Other complaints     Plan:  1. INR was SUPRAtherapeutic on 10/16 at 3.3. After consulting with Keaton Duran, PharmD, instructed patient to decrease warfarin to 5mg daily (6.7% decrease)  2. Repeat INR on thurs, 10/25  3. Verbal information provided over the phone. Patient's wife, Aleta, RBV dosing instructions, expresses understanding by teach back, and has no further questions at this time.    Keaton Perez,  [facility]  10/18/2018  12:29 PM     Keaton HASTINGS, [facility], have reviewed the note in full and agree with the assessment and plan.  10/18/18  1:04 PM

## 2018-10-18 NOTE — TELEPHONE ENCOUNTER
Spoke with patient re:overdue PT/INR self test. Patient/wife said he self tested on Tues, 10/16 with an INR of 3.3. Will call [facility] for results.

## 2018-10-31 ENCOUNTER — ANTICOAGULATION VISIT (OUTPATIENT)
Dept: PHARMACY | Facility: HOSPITAL | Age: 73
End: 2018-10-31

## 2018-10-31 DIAGNOSIS — I48.0 PAROXYSMAL ATRIAL FIBRILLATION (HCC): ICD-10-CM

## 2018-10-31 LAB — INR PPP: 1.5

## 2018-11-16 LAB — INR PPP: 2.39

## 2018-11-20 ENCOUNTER — ANTICOAGULATION VISIT (OUTPATIENT)
Dept: PHARMACY | Facility: HOSPITAL | Age: 73
End: 2018-11-20

## 2018-11-20 DIAGNOSIS — I48.0 PAROXYSMAL ATRIAL FIBRILLATION (HCC): ICD-10-CM

## 2018-11-20 NOTE — PROGRESS NOTES
Anticoagulation Clinic - Remote Progress Note  [facility]  Frequency of Monitoring: every 4 weeks    Indication: paroxysmal afib   Referring Provider: MARCEL Bryan, Dr Holt  Goal INR: 2.0-3.0  Current Drug Interactions: aspirin, fenofibrate, glyburide, levothyroxine, spironolactone  CHADS-VASc: 3 (age, DM, CHF)    Diet: wife says a salad a week, green beans sometimes  Alcohol: none  Tobacco: none  OTC Pain Medication: APAP prn     INR History:  Date 8/11 9/19 11/7 12/13 1/31/18 3/6 4/23 5/31 7/10   Total Weekly Dose 40mg 40mg 40mg 40mg 40mg 40mg 40mg 40mg 15mg ??   INR 2.4 2.5 2.1 2.3 2.5 2.1 2.6 2.7 1.1   Notes  1st of clinic      missed 1x dose amox     Date 7/13 7/17 7/23 7/27 8/27 9/7 10/16 10/31 11/16   Total Weekly Dose 27.5  mg 47.5 mg 35mg 35mg 40mg 40mg 37.5  mg 35mg 37.5mg   INR 1.5 2.3 2.8 2.3 3.2 3.8 3.3 1.5 2.39   Notes amox boost self adj   lost 20lbs   1x boost; r'ceived 11/20     Date            Total Weekly Dose            INR            Notes              Phone Interview:  Tablet Strength: 5mg and 7.5mg tablets  Patient Contact Info: [phone] **preferred**  Aleta (wife) is point of contact, her cell [phone]  Lab Contact Info: [facility] ([phone])    Patient Findings   Positives:  Extra doses   Negatives:  Signs/symptoms of thrombosis, Signs/symptoms of bleeding, Laboratory test error suspected, Change in health, Change in alcohol use, Change in activity, Upcoming invasive procedure, Emergency department visit, Upcoming dental procedure, Missed doses, Change in medications, Change in diet/appetite, Hospital admission, Bruising, Other complaints   Comments:  Patient's wife reports that patient has been taking his 7.5mg dose on Thursdays (vs tracker showing 7.5mg on Wednesday)     Additionally, patient wife gave him an extra dose of 7.5mg on Sunday, 11/18. She mis-remembered his current maintenance dose and had gone with a previous maintenance  dose. As such, we have moved his one 7.5mg day from Thurs to Sunday. Patient's wife was agreeable to the change and voiced understanding.      Plan:  1. INR is therapeutic and back WNL on 11/16 at 2.39, though results were received on Tues, 11/20. Instructed Mrs. Johnson to have her  continue warfarin 5mg daily except for 7.5mg on Sunday   2. Repeat INR in ~1.5 weeks, 11/29.  3. Verbal information provided over the phone. Patient's wife, Aleta, RBV dosing instructions, expresses understanding by teach back, and has no further questions at this time.  4. Patient received new test strips during our encounter today. As such, they will test on the new test strips at next encounter and report the new lot number then.    Keaton Perez, [facility]  11/20/2018  11:12 AM    Given Mr. Butts is therapeutic, we will need to determine if he requires dose increase to 7.5mg twice weekly.  I, Suzie Stephenson, PharmD, have reviewed the note in full and agree with the assessment and plan.  11/21/18  8:05 AM

## 2018-11-21 DIAGNOSIS — I48.0 PAROXYSMAL ATRIAL FIBRILLATION (HCC): Primary | ICD-10-CM

## 2018-12-05 ENCOUNTER — ANTICOAGULATION VISIT (OUTPATIENT)
Dept: PHARMACY | Facility: HOSPITAL | Age: 73
End: 2018-12-05

## 2018-12-05 DIAGNOSIS — I48.0 PAROXYSMAL ATRIAL FIBRILLATION (HCC): ICD-10-CM

## 2018-12-05 LAB — INR PPP: 3.7

## 2018-12-05 NOTE — PROGRESS NOTES
Anticoagulation Clinic - Remote Progress Note  [facility]  Frequency of Monitoring: every 4 weeks    Indication: paroxysmal afib   Referring Provider: MARCEL Bryna, Dr. Holt  Goal INR: 2.0-3.0  Current Drug Interactions: aspirin, fenofibrate, glyburide, levothyroxine, spironolactone  CHADS-VASc: 3 (age, DM, CHF)    Diet: wife says a salad a week, green beans sometimes  Alcohol: none  Tobacco: none  OTC Pain Medication: APAP prn     INR History:  Date 8/11 9/19 11/7 12/13 1/31/18 3/6 4/23 5/31 7/10   Total Weekly Dose 40mg 40mg 40mg 40mg 40mg 40mg 40mg 40mg 15mg ??   INR 2.4 2.5 2.1 2.3 2.5 2.1 2.6 2.7 1.1   Notes  1st of clinic      missed 1x dose amox     Date 7/13 7/17 7/23 7/27 8/27 9/7 10/16 10/31 11/16   Total Weekly Dose 27.5  mg 47.5 mg 35mg 35mg 40mg 40mg 37.5  mg 35mg 37.5mg   INR 1.5 2.3 2.8 2.3 3.2 3.8 3.3 1.5 2.39   Notes amox boost self adj   lost 20lbs   1x boost; r'ceived 11/20     Date 12/3           Total Weekly Dose 37.5mg           INR 3.7           Notes              Phone Interview:  Tablet Strength: 5mg and 7.5mg tablets  Patient Contact Info: [phone] **preferred**  Aleta (wife) is point of contact, her cell [phone]  Lab Contact Info: [facility] ([phone])    Patient Findings   Negatives:  Signs/symptoms of thrombosis, Signs/symptoms of bleeding, Laboratory test error suspected, Change in health, Change in alcohol use, Change in activity, Upcoming invasive procedure, Emergency department visit, Upcoming dental procedure, Missed doses, Extra doses, Change in medications, Change in diet/appetite, Hospital admission, Bruising, Other complaints   Comments:   INR was SUPRAtherapeutic on Monday 12/6 and did not report his results until Wednesday 12/5. They were concerned about incorrect testing techniques and plan to retest tomorrow 12/6.      Plan:  1. INR is SUPRAtherapeutic on Monday 12/6 and did not report his results until Wednesday 12/5.  They were concerned about incorrect testing techniques and plan to retest tomorrow 12/6. Therefore, Mr. Johnson will take 5mg tonight as part of his normal maintenance dose of warfarin 5mg daily except for 7.5mg on Sunday.   2. Repeat INR tomorrow 12/6.   3. Verbal information provided over the phone. Patient's wife, Aleta, RBV dosing instructions, expresses understanding by teach back, and has no further questions at this time.    Suzie Stephenson, PharmD  12/5/2018  4:51 PM     12/6 Addendum: Called Mr. Johnson who reports that Mrs. Johnson had an emergency today and they weren't able to test. They will plan to test tomorrow morning.

## 2018-12-07 ENCOUNTER — ANTICOAGULATION VISIT (OUTPATIENT)
Dept: PHARMACY | Facility: HOSPITAL | Age: 73
End: 2018-12-07

## 2018-12-07 DIAGNOSIS — I48.0 PAROXYSMAL ATRIAL FIBRILLATION (HCC): ICD-10-CM

## 2018-12-07 LAB — INR PPP: 3.9

## 2018-12-07 NOTE — PROGRESS NOTES
Anticoagulation Clinic - Remote Progress Note  [facility]  Frequency of Monitoring: every 4 weeks    Indication: paroxysmal afib   Referring Provider: MARCEL Bryan, Dr. Holt  Goal INR: 2.0-3.0  Current Drug Interactions: aspirin, fenofibrate, glyburide, levothyroxine, spironolactone  CHADS-VASc: 3 (age, DM, CHF)    Diet: wife says a salad a week, green beans sometimes  Alcohol: none  Tobacco: none  OTC Pain Medication: APAP prn     INR History:  Date 8/11 9/19 11/7 12/13 1/31/18 3/6 4/23 5/31 7/10   Total Weekly Dose 40mg 40mg 40mg 40mg 40mg 40mg 40mg 40mg 15mg ??   INR 2.4 2.5 2.1 2.3 2.5 2.1 2.6 2.7 1.1   Notes  1st of clinic      missed 1x dose amox     Date 7/13 7/17 7/23 7/27 8/27 9/7 10/16 10/31 11/16   Total Weekly Dose 27.5  mg 47.5 mg 35mg 35mg 40mg 40mg 37.5  mg 35mg 37.5mg   INR 1.5 2.3 2.8 2.3 3.2 3.8 3.3 1.5 2.39   Notes amox boost self adj   lost 20lbs   1x boost; r'ceived 11/20     Date 12/3 12/7          Total Weekly Dose 37.5mg 37.5mg          INR 3.7 3.9          Notes              Phone Interview:  Tablet Strength: 5mg and 7.5mg tablets  Patient Contact Info: [phone] **preferred**  Aleta (wife) is point of contact, her cell [phone]  Lab Contact Info: [facility] ([phone])    Patient Findings:   Negatives:  Signs/symptoms of thrombosis, Signs/symptoms of bleeding, Laboratory test error suspected, Change in health, Change in alcohol use, Change in activity, Upcoming invasive procedure, Emergency department visit, Upcoming dental procedure, Missed doses, Extra doses, Change in medications, Change in diet/appetite, Hospital admission, Bruising, Other complaints   Comments:  Mr. Johnson does not report any change in medications, health, or diet that might contribute to this INR elevation. He also notes his bruising has improved.      Plan:  1. INR is supratherapeutic again today, so instructed Mr. Johnson to take 2.5mg tonight, then continue warfarin  5mg daily thereafter.    2. Repeat INR in one week.   3. Verbal information provided over the phone. Patient's wife, Aleta, RBV dosing instructions, expresses understanding by teach back, and has no further questions at this time.    Ann Cowden Mayer, PharmD  12/7/2018  4:27 PM

## 2018-12-10 ENCOUNTER — ANTICOAGULATION VISIT (OUTPATIENT)
Dept: PHARMACY | Facility: HOSPITAL | Age: 73
End: 2018-12-10

## 2018-12-10 DIAGNOSIS — I48.0 PAROXYSMAL ATRIAL FIBRILLATION (HCC): ICD-10-CM

## 2018-12-10 LAB — INR PPP: 3.1

## 2018-12-10 NOTE — PROGRESS NOTES
Anticoagulation Clinic - Remote Progress Note  [facility]  Frequency of Monitoring: every 4 weeks    Indication: paroxysmal afib   Referring Provider: MARCEL Bryan, Dr. Holt  Goal INR: 2.0-3.0  Current Drug Interactions: aspirin, fenofibrate, glyburide, levothyroxine, spironolactone  CHADS-VASc: 3 (age, DM, CHF)    Diet: wife says a salad a week, green beans sometimes  Alcohol: none  Tobacco: none  OTC Pain Medication: APAP prn     INR History:  Date 8/11 9/19 11/7 12/13 1/31/18 3/6 4/23 5/31 7/10   Total Weekly Dose 40mg 40mg 40mg 40mg 40mg 40mg 40mg 40mg 15mg ??   INR 2.4 2.5 2.1 2.3 2.5 2.1 2.6 2.7 1.1   Notes  1st of clinic      missed 1x dose amox     Date 7/13 7/17 7/23 7/27 8/27 9/7 10/16 10/31 11/16   Total Weekly Dose 27.5  mg 47.5 mg 35mg 35mg 40mg 40mg 37.5  mg 35mg 37.5mg   INR 1.5 2.3 2.8 2.3 3.2 3.8 3.3 1.5 2.39   Notes amox boost self adj   lost 20lbs   1x boost; r'ceived 11/20     Date 12/3 12/7 12/10         Total Weekly Dose 37.5mg 37.5mg 32.5mg         INR 3.7 3.9 3.1         Notes              Phone Interview:  Tablet Strength: 5mg and 7.5mg tablets  Patient Contact Info: [phone] **preferred**  Aleta (wife) is point of contact, her cell [phone]  Lab Contact Info: [facility] ([phone])    Patient Findings:  Negatives:  Signs/symptoms of thrombosis, Signs/symptoms of bleeding, Laboratory test error suspected, Change in health, Change in alcohol use, Change in activity, Upcoming invasive procedure, Emergency department visit, Upcoming dental procedure, Missed doses, Extra doses, Change in medications, Change in diet/appetite, Hospital admission, Bruising, Other complaints     Plan:  1. INR is slightly supratherapeutic today at 3.1, although improved from last week. After consulting with Celestina ZamoranoD, instructed patient to continue warfarin 5mg daily until recheck.  2. Repeat INR in one week, 12/17. Patient requests 2 weeks testing  intervals if therapeutic at next encounter.  3. Verbal information provided over the phone. Patient's wife, Aleta, read back and verified dosing instructions, expresses understanding by teach back, and has no further questions at this time.    Jennie Parekh CPhT  12/10/2018  4:30 PM     I, Zay Glass, [facility], have reviewed the note in full and agree with the assessment and plan.  12/11/18  8:04 AM

## 2018-12-19 ENCOUNTER — ANTICOAGULATION VISIT (OUTPATIENT)
Dept: PHARMACY | Facility: HOSPITAL | Age: 73
End: 2018-12-19

## 2018-12-19 DIAGNOSIS — I48.0 PAROXYSMAL ATRIAL FIBRILLATION (HCC): ICD-10-CM

## 2018-12-19 LAB — INR PPP: 2.5

## 2018-12-19 NOTE — PROGRESS NOTES
Anticoagulation Clinic - Remote Progress Note  [facility]  Frequency of Monitoring: every 4 weeks    Indication: paroxysmal afib   Referring Provider: MARCEL Bryan, Dr. Holt  Goal INR: 2.0-3.0  Current Drug Interactions: aspirin, fenofibrate, glyburide, levothyroxine, spironolactone  CHADS-VASc: 3 (age, DM, CHF)    Diet: wife says a salad a week, green beans sometimes  Alcohol: none  Tobacco: none  OTC Pain Medication: APAP prn     INR History:  Date 8/11 9/19 11/7 12/13 1/31/18 3/6 4/23 5/31 7/10   Total Weekly Dose 40mg 40mg 40mg 40mg 40mg 40mg 40mg 40mg 15mg ??   INR 2.4 2.5 2.1 2.3 2.5 2.1 2.6 2.7 1.1   Notes  1st of clinic      missed 1x dose amox     Date 7/13 7/17 7/23 7/27 8/27 9/7 10/16 10/31 11/16   Total Weekly Dose 27.5  mg 47.5 mg 35mg 35mg 40mg 40mg 37.5  mg 35mg 37.5mg   INR 1.5 2.3 2.8 2.3 3.2 3.8 3.3 1.5 2.39   Notes amox boost self adj   lost 20lbs   1x boost; r'ceived 11/20     Date 12/3 12/7 12/10 12/19        Total Weekly Dose 37.5mg 37.5mg 32.5mg 35mg        INR 3.7 3.9 3.1 2.5        Notes    sick          Phone Interview:  Tablet Strength: 5mg and 7.5mg tablets  Patient Contact Info: [phone] **preferred**  Aleta (wife) is point of contact, her cell [phone]  Lab Contact Info: [facility] ([phone])    Patient Findings:  Positives:  Change in health, Change in medications   Negatives:  Signs/symptoms of thrombosis, Signs/symptoms of bleeding, Laboratory test error suspected, Change in alcohol use, Change in activity, Upcoming invasive procedure, Emergency department visit, Upcoming dental procedure, Missed doses, Extra doses, Change in diet/appetite, Hospital admission, Bruising, Other complaints   Comments:  Patient has had a bit of a head cold and been using OTC cough syrup and sinus medication per patient wife.     Plan:  1. INR is back WNL today at 2.5. After consulting with Suzie Stephenson, PharmD, instructed patient spouse to continue  warfarin 5mg daily.  2. Repeat INR in two weeks, 1/2.  3. Verbal information provided over the phone. Patient's wife, Aleta, read back and verified dosing instructions, expresses understanding by teach back, and has no further questions at this time.    Jennie Parekh, Ozzie  12/19/2018  4:10 PM     I, Suzie Stephenson, PharmD, have reviewed the note in full and agree with the assessment and plan.  12/21/18  12:45 PM

## 2019-01-04 ENCOUNTER — ANTICOAGULATION VISIT (OUTPATIENT)
Dept: PHARMACY | Facility: HOSPITAL | Age: 74
End: 2019-01-04

## 2019-01-04 DIAGNOSIS — I48.0 PAROXYSMAL ATRIAL FIBRILLATION (HCC): ICD-10-CM

## 2019-01-04 LAB — INR PPP: 2.3

## 2019-01-04 NOTE — PROGRESS NOTES
Anticoagulation Clinic - Remote Progress Note  [facility]  Frequency of Monitoring: every 4 weeks    Indication: paroxysmal afib   Referring Provider: MARCEL Bryan, Dr. Holt  Goal INR: 2.0-3.0  Current Drug Interactions: aspirin, fenofibrate, glyburide, levothyroxine, spironolactone  CHADS-VASc: 3 (age, DM, CHF)    Diet: wife says a salad a week, green beans sometimes  Alcohol: none  Tobacco: none  OTC Pain Medication: APAP prn     INR History:  Date 8/11 9/19 11/7 12/13 1/31/18 3/6 4/23 5/31 7/10   Total Weekly Dose 40mg 40mg 40mg 40mg 40mg 40mg 40mg 40mg 15mg ??   INR 2.4 2.5 2.1 2.3 2.5 2.1 2.6 2.7 1.1   Notes  1st of clinic      missed 1x dose amox     Date 7/13 7/17 7/23 7/27 8/27 9/7 10/16 10/31 11/16   Total Weekly Dose 27.5  mg 47.5 mg 35mg 35mg 40mg 40mg 37.5  mg 35mg 37.5mg   INR 1.5 2.3 2.8 2.3 3.2 3.8 3.3 1.5 2.39   Notes amox boost self adj   lost 20lbs   1x boost; r'ceived 11/20     Date 12/3 12/7 12/10 12/19 1/4/19       Total Weekly Dose 37.5mg 37.5mg 32.5mg 35mg 35mg       INR 3.7 3.9 3.1 2.5 2.3       Notes    sick          Phone Interview:  Tablet Strength: 5mg and 7.5mg tablets  Patient Contact Info: [phone] **preferred**  Aleta (wife) is point of contact, her cell [phone]  Lab Contact Info: [facility] ([phone])    Patient Findings   Negatives:  Signs/symptoms of thrombosis, Signs/symptoms of bleeding, Laboratory test error suspected, Change in health, Change in alcohol use, Change in activity, Upcoming invasive procedure, Emergency department visit, Upcoming dental procedure, Missed doses, Extra doses, Change in medications, Change in diet/appetite, Hospital admission, Bruising, Other complaints     Plan:  1. INR is back therapeutic today at 2.3. Instructed patient's spouse, Aleta, to have patient continue warfarin 5mg daily.  2. Repeat INR in two weeks, 1/18  3. Verbal information provided over the phone. Benjamín Johnson Jr's spouse, Aleta, RBV  dosing instructions, expresses understanding by teach back, and has no further questions at this time.    Keaton Perez CPhT  1/4/2019  2:44 PM     I, Keaton Duran [facility], have reviewed the note in full and agree with the assessment and plan.  01/04/19  4:18 PM

## 2019-01-31 ENCOUNTER — TELEPHONE (OUTPATIENT)
Dept: PHARMACY | Facility: HOSPITAL | Age: 74
End: 2019-01-31

## 2019-02-04 ENCOUNTER — ANTICOAGULATION VISIT (OUTPATIENT)
Dept: PHARMACY | Facility: HOSPITAL | Age: 74
End: 2019-02-04

## 2019-02-04 DIAGNOSIS — I48.0 PAROXYSMAL ATRIAL FIBRILLATION (HCC): ICD-10-CM

## 2019-02-04 LAB — INR PPP: 2

## 2019-02-04 RX ORDER — WARFARIN SODIUM 5 MG/1
TABLET ORAL
COMMUNITY

## 2019-02-04 NOTE — PROGRESS NOTES
Anticoagulation Clinic - Remote Progress Note  [facility]  Frequency of Monitoring: every 4 weeks    Indication: paroxysmal afib   Referring Provider: MARCEL Bryan, Dr. Holt  Goal INR: 2.0-3.0  Current Drug Interactions: aspirin, fenofibrate, glyburide, levothyroxine, spironolactone  CHADS-VASc: 3 (age, DM, CHF)    Diet: wife says a salad a week, green beans sometimes  Alcohol: none  Tobacco: none  OTC Pain Medication: APAP prn     INR History:  Date 8/11 9/19 11/7 12/13 1/31/18 3/6 4/23 5/31 7/10   Total Weekly Dose 40mg 40mg 40mg 40mg 40mg 40mg 40mg 40mg 15mg ??   INR 2.4 2.5 2.1 2.3 2.5 2.1 2.6 2.7 1.1   Notes  1st of clinic      missed 1x dose amox     Date 7/13 7/17 7/23 7/27 8/27 9/7 10/16 10/31 11/16   Total Weekly Dose 27.5  mg 47.5 mg 35mg 35mg 40mg 40mg 37.5  mg 35mg 37.5mg   INR 1.5 2.3 2.8 2.3 3.2 3.8 3.3 1.5 2.39   Notes amox boost self adj   lost 20lbs   1x boost; r'ceived 11/20     Date 12/3 12/7 12/10 12/19 1/4/19 2/4      Total Weekly Dose 37.5mg 37.5mg 32.5mg 35mg 35mg 35mg      INR 3.7 3.9 3.1 2.5 2.3 2.0      Notes    sick          Phone Interview:  Tablet Strength: 5mg and 7.5mg tablets  Patient Contact Info: [phone] **preferred**  Aleta (wife) is point of contact, her cell [phone]  Lab Contact Info: [facility] ([phone])      Plan:  1. INR is therapeutic today at 2.0, though this is at the lower limit of his INR goal range. Instructed patient's spouse, Aleta, to have patient continue warfarin 5mg daily.  2. Repeat INR in 1 week, 2/11  3. Verbal information provided over the phone. Benjamín Johnson Jr's spouse, Aleta, RBV dosing instructions, expresses understanding by teach back, and has no further questions at this time.  4. Med list reviewed and updated.    Keaton Perez, CPSeveriano  2/4/2019  1:35 PM    I, Yomaira Briscoe, PharmD, have reviewed the note in full and agree with the assessment and plan.  02/05/19  7:56 AM

## 2019-02-20 ENCOUNTER — ANTICOAGULATION VISIT (OUTPATIENT)
Dept: PHARMACY | Facility: HOSPITAL | Age: 74
End: 2019-02-20

## 2019-02-20 DIAGNOSIS — I48.0 PAROXYSMAL ATRIAL FIBRILLATION (HCC): ICD-10-CM

## 2019-02-20 LAB — INR PPP: 2.4

## 2019-02-20 NOTE — PROGRESS NOTES
Anticoagulation Clinic - Remote Progress Note  [facility]  Frequency of Monitoring: every 4 weeks    Indication: paroxysmal afib   Referring Provider: MARCEL Bryan, Dr. Holt  Goal INR: 2.0-3.0  Current Drug Interactions: aspirin, fenofibrate, glyburide, levothyroxine, spironolactone  CHADS-VASc: 3 (age, DM, CHF)    Diet: wife says a salad a week, green beans sometimes  Alcohol: none  Tobacco: none  OTC Pain Medication: APAP prn     INR History:  Date 8/11 9/19 11/7 12/13 1/31/18 3/6 4/23 5/31 7/10   Total Weekly Dose 40mg 40mg 40mg 40mg 40mg 40mg 40mg 40mg 15mg ??   INR 2.4 2.5 2.1 2.3 2.5 2.1 2.6 2.7 1.1   Notes  1st of clinic      missed 1x dose amox     Date 7/13 7/17 7/23 7/27 8/27 9/7 10/16 10/31 11/16   Total Weekly Dose 27.5  mg 47.5 mg 35mg 35mg 40mg 40mg 37.5  mg 35mg 37.5mg   INR 1.5 2.3 2.8 2.3 3.2 3.8 3.3 1.5 2.39   Notes amox boost self adj   lost 20lbs   1x boost; r'ceived 11/20     Date 12/3 12/7 12/10 12/19 1/4/19 2/4 2/20     Total Weekly Dose 37.5mg 37.5mg 32.5mg 35mg 35mg 35mg 35mg     INR 3.7 3.9 3.1 2.5 2.3 2.0 2.4     Notes    sick          Phone Interview:  Tablet Strength: 5mg and 7.5mg tablets  Patient Contact Info: [phone] **preferred**  Aleta (wife) is point of contact, her cell [phone]  Lab Contact Info: [facility] ([phone])    Patient Findings   Negatives:  Signs/symptoms of thrombosis, Signs/symptoms of bleeding, Laboratory test error suspected, Change in health, Change in alcohol use, Change in activity, Upcoming invasive procedure, Emergency department visit, Upcoming dental procedure, Missed doses, Extra doses, Change in medications, Change in diet/appetite, Hospital admission, Bruising, Other complaints     Plan:  1. INR is therapeutic today at 2.4. Instructed patient's spouse, Aleta, to have patient continue warfarin 5mg oral daily.  2. Repeat INR in 2 weeks on 3/6  3. Verbal information provided over the phone. Benjamín Johnson  Jr's spouse, Aleta, RBV dosing instructions, expresses understanding by teach back, and has no further questions at this time.  4. Med list reviewed and updated.    Keaton Perez CPhT  2/20/2019  2:51 PM     I, Yomaira Briscoe, [facility], have reviewed the note in full and agree with the assessment and plan.  mai entered  02/20/19  3:11 PM

## 2019-03-19 ENCOUNTER — TELEPHONE (OUTPATIENT)
Dept: PHARMACY | Facility: HOSPITAL | Age: 74
End: 2019-03-19

## 2019-03-19 NOTE — TELEPHONE ENCOUNTER
Spoke with patient's wife re:overdue PT/INR self test    She says she will make him self test tonight, is aware we will call when results are received

## 2019-03-28 NOTE — TELEPHONE ENCOUNTER
Spoke with patient's wife re:overdue PT/INR self test. She will tell him to self test tonight. If no records are received, will send 1st letter

## 2019-04-01 ENCOUNTER — ANTICOAGULATION VISIT (OUTPATIENT)
Dept: PHARMACY | Facility: HOSPITAL | Age: 74
End: 2019-04-01

## 2019-04-01 DIAGNOSIS — I48.0 PAROXYSMAL ATRIAL FIBRILLATION (HCC): ICD-10-CM

## 2019-04-01 LAB — INR PPP: 2.7

## 2019-04-01 NOTE — PROGRESS NOTES
Anticoagulation Clinic - Remote Progress Note  [facility]  Frequency of Monitoring: every 4 weeks    Indication: paroxysmal afib   Referring Provider: MARCEL Bryan, Dr. Holt  Goal INR: 2.0-3.0  Current Drug Interactions: aspirin, fenofibrate, glyburide, levothyroxine, spironolactone  CHADS-VASc: 3 (age, DM, CHF)    Diet: wife says a salad a week, green beans sometimes  Alcohol: none  Tobacco: none  OTC Pain Medication: APAP prn     INR History:  Date 8/11 9/19 11/7 12/13 1/31/18 3/6 4/23 5/31 7/10   Total Weekly Dose 40mg 40mg 40mg 40mg 40mg 40mg 40mg 40mg 15mg ??   INR 2.4 2.5 2.1 2.3 2.5 2.1 2.6 2.7 1.1   Notes  1st of clinic      missed 1x dose amox     Date 7/13 7/17 7/23 7/27 8/27 9/7 10/16 10/31 11/16   Total Weekly Dose 27.5  mg 47.5 mg 35mg 35mg 40mg 40mg 37.5  mg 35mg 37.5mg   INR 1.5 2.3 2.8 2.3 3.2 3.8 3.3 1.5 2.39   Notes amox boost self adj   lost 20lbs   1x boost; r'ceived 11/20     Date 12/3 12/7 12/10 12/19 1/4/19 2/4 2/20 4/1    Total Weekly Dose 37.5mg 37.5mg 32.5mg 35mg 35mg 35mg 35mg 35mg    INR 3.7 3.9 3.1 2.5 2.3 2.0 2.4 2.7    Notes    sick          Phone Interview:  Tablet Strength: 5mg and 7.5mg tablets  Patient Contact Info: [phone] **preferred**  Aleta (wife) is point of contact, her cell [phone]  Lab Contact Info: [facility] ([phone])    Patient Findings   Negatives:  Signs/symptoms of thrombosis, Signs/symptoms of bleeding, Laboratory test error suspected, Change in health, Change in alcohol use, Change in activity, Upcoming invasive procedure, Emergency department visit, Upcoming dental procedure, Missed doses, Extra doses, Change in medications, Change in diet/appetite, Hospital admission, Bruising, Other complaints     Plan:  1. INR is therapeutic today. Instructed patient's spouse, Aleta, to have patient continue warfarin 5mg daily.  2. Repeat INR in 2 weeks  3. Verbal information provided over the phone. Benjamín Alex 's spouse,  Aleta, RBV dosing instructions, expresses understanding by teach back, and has no further questions at this time.  4. Patient declines the need for warfarin refills at this time    Keaton Perez, Ozzie  4/1/2019  11:35 AM    I, Sylvie Kaiser, [facility], have reviewed the note in full and agree with the assessment and plan.  04/01/19  3:02 PM

## 2019-04-26 ENCOUNTER — TELEPHONE (OUTPATIENT)
Dept: PHARMACY | Facility: HOSPITAL | Age: 74
End: 2019-04-26

## 2019-04-26 DIAGNOSIS — I48.0 PAROXYSMAL ATRIAL FIBRILLATION (HCC): Primary | ICD-10-CM

## 2019-05-02 ENCOUNTER — ANTICOAGULATION VISIT (OUTPATIENT)
Dept: PHARMACY | Facility: HOSPITAL | Age: 74
End: 2019-05-02

## 2019-05-02 DIAGNOSIS — I48.0 PAROXYSMAL ATRIAL FIBRILLATION (HCC): ICD-10-CM

## 2019-05-02 LAB — INR PPP: 2.3

## 2019-05-02 NOTE — PROGRESS NOTES
Anticoagulation Clinic - Remote Progress Note  [facility]  Frequency of Monitoring: every 4 weeks    Indication: paroxysmal afib   Referring Provider: MARCEL Bryan, Dr. Holt  Goal INR: 2.0-3.0  Current Drug Interactions: aspirin, fenofibrate, glyburide, levothyroxine, spironolactone  CHADS-VASc: 3 (age, DM, CHF)    Diet: wife says a salad a week, green beans sometimes  Alcohol: none  Tobacco: none  OTC Pain Medication: APAP prn     INR History:  Date 8/11 9/19 11/7 12/13 1/31/18 3/6 4/23 5/31 7/10   Total Weekly Dose 40mg 40mg 40mg 40mg 40mg 40mg 40mg 40mg 15mg ??   INR 2.4 2.5 2.1 2.3 2.5 2.1 2.6 2.7 1.1   Notes  1st of clinic      missed 1x dose amox     Date 7/13 7/17 7/23 7/27 8/27 9/7 10/16 10/31 11/16   Total Weekly Dose 27.5  mg 47.5 mg 35mg 35mg 40mg 40mg 37.5  mg 35mg 37.5mg   INR 1.5 2.3 2.8 2.3 3.2 3.8 3.3 1.5 2.39   Notes amox boost self adj   lost 20lbs   1x boost; r'ceived 11/20     Date 12/3 12/7 12/10 12/19 1/4/19 2/4 2/20 4/1 5/2   Total Weekly Dose 37.5mg 37.5mg 32.5mg 35mg 35mg 35mg 35mg 35mg 35mg   INR 3.7 3.9 3.1 2.5 2.3 2.0 2.4 2.7 2.3   Notes    sick          Phone Interview:  Tablet Strength: 5mg and 7.5mg tablets  Patient Contact Info: [phone] **preferred**  Aleta (wife) is point of contact, her cell [phone]  Lab Contact Info: [facility] ([phone])    Patient Findings   Negatives:  Signs/symptoms of thrombosis, Signs/symptoms of bleeding, Laboratory test error suspected, Change in health, Change in alcohol use, Change in activity, Upcoming invasive procedure, Emergency department visit, Upcoming dental procedure, Missed doses, Extra doses, Change in medications, Change in diet/appetite, Hospital admission, Bruising, Other complaints     Plan:  1. INR is therapeutic today. Instructed patient's spouse, Aleta, to have patient continue warfarin 5mg daily.  2. Repeat INR in 4 weeks  3. Verbal information provided over the phone. Benjamín Johnson  Jr's spouse, Aleta, RBV dosing instructions, expresses understanding by teach back, and has no further questions at this time.  4. Patient declines the need for warfarin refills at this time    Keaton Perez CPhT  5/2/2019  1:10 PM     I, Keaton Duran, [facility], have reviewed the note in full and agree with the assessment and plan.  05/03/19  4:17 PM

## 2019-06-10 ENCOUNTER — TELEPHONE (OUTPATIENT)
Dept: PHARMACY | Facility: HOSPITAL | Age: 74
End: 2019-06-10

## 2019-06-10 NOTE — TELEPHONE ENCOUNTER
Spoke with patient's spouse, Aleta, re:overdue PT/INR self test. Aleat said she will test him tonight and we should have results tomorrow. She is aware we will call and speak once results are received.

## 2019-06-25 ENCOUNTER — ANTICOAGULATION VISIT (OUTPATIENT)
Dept: PHARMACY | Facility: HOSPITAL | Age: 74
End: 2019-06-25

## 2019-06-25 DIAGNOSIS — I48.0 PAROXYSMAL ATRIAL FIBRILLATION (HCC): ICD-10-CM

## 2019-06-25 LAB
INR PPP: 2.4
INR PPP: 2.5

## 2019-06-26 NOTE — PROGRESS NOTES
Anticoagulation Clinic - Remote Progress Note  [facility]  Frequency of Monitoring: every 4 weeks    Indication: paroxysmal afib   Referring Provider: MARCEL Bryan, Dr. Holt  Goal INR: 2.0-3.0  Current Drug Interactions: aspirin, fenofibrate, glyburide, levothyroxine, spironolactone  CHADS-VASc: 3 (age, DM, CHF)    Diet: wife says a salad a week, green beans sometimes  Alcohol: none  Tobacco: none  OTC Pain Medication: APAP prn     INR History:  Date 8/11 9/19 11/7 12/13 1/31/18 3/6 4/23 5/31 7/10   Total Weekly Dose 40mg 40mg 40mg 40mg 40mg 40mg 40mg 40mg 15mg ??   INR 2.4 2.5 2.1 2.3 2.5 2.1 2.6 2.7 1.1   Notes  1st of clinic      missed 1x dose amox     Date 7/13 7/17 7/23 7/27 8/27 9/7 10/16 10/31 11/16   Total Weekly Dose 27.5  mg 47.5 mg 35mg 35mg 40mg 40mg 37.5  mg 35mg 37.5mg   INR 1.5 2.3 2.8 2.3 3.2 3.8 3.3 1.5 2.39   Notes amox boost self adj   lost 20lbs   1x boost; r'ceived 11/20     Date 12/3 12/7 12/10 12/19 1/4/19 2/4 2/20 4/1 5/2   Total Weekly Dose 37.5mg 37.5mg 32.5mg 35mg 35mg 35mg 35mg 35mg 35mg   INR 3.7 3.9 3.1 2.5 2.3 2.0 2.4 2.7 2.3   Notes    sick          Date 6/26           Total Weekly Dose 35mg           INR 2.5           Notes              Phone Interview:  Tablet Strength: 5mg and 7.5mg tablets  Patient Contact Info: [phone] **preferred**  Aleta (wife) is point of contact, her cell [phone]  Lab Contact Info: [facility] / [facility] ([phone])    Patient Findings   Negatives:  Signs/symptoms of thrombosis, Signs/symptoms of bleeding, Laboratory test error suspected, Change in health, Change in alcohol use, Change in activity, Upcoming invasive procedure, Emergency department visit, Upcoming dental procedure, Missed doses, Extra doses, Change in medications, Change in diet/appetite, Hospital admission, Bruising, Other complaints   Comments:  Patient denies all findings.     Plan:  1. INR is therapeutic today. Instructed patient to continue  warfarin 5mg daily.  2. Repeat INR in 4 weeks  3. Verbal information provided over the phone. Benjamín Johnson Jr RBV dosing instructions, expresses understanding by teach back, and has no further questions at this time.  4. Patient declines the need for warfarin refills at this time    Keaton Perez CPhT  6/26/2019  4:20 PM     ISylvie, [facility], have reviewed the note in full and agree with the assessment and plan.  06/26/19  4:35 PM

## 2019-08-02 ENCOUNTER — ANTICOAGULATION VISIT (OUTPATIENT)
Dept: PHARMACY | Facility: HOSPITAL | Age: 74
End: 2019-08-02

## 2019-08-02 ENCOUNTER — TELEPHONE (OUTPATIENT)
Dept: PHARMACY | Facility: HOSPITAL | Age: 74
End: 2019-08-02

## 2019-08-02 DIAGNOSIS — I48.0 PAROXYSMAL ATRIAL FIBRILLATION (HCC): ICD-10-CM

## 2019-08-02 LAB — INR PPP: 3.4

## 2019-08-02 NOTE — PROGRESS NOTES
Anticoagulation Clinic - Remote Progress Note  [facility]  Frequency of Monitoring: every 4 weeks    Indication: paroxysmal afib   Referring Provider: MARCEL Bryan, Dr. Holt  Goal INR: 2.0-3.0  Current Drug Interactions: aspirin, fenofibrate, glyburide, levothyroxine, spironolactone  CHADS-VASc: 3 (age, DM, CHF)    Diet: wife says a salad a week, green beans sometimes  Alcohol: none  Tobacco: none  OTC Pain Medication: APAP prn     INR History:  Date 8/11 9/19 11/7 12/13 1/31/18 3/6 4/23 5/31 7/10   Total Weekly Dose 40mg 40mg 40mg 40mg 40mg 40mg 40mg 40mg 15mg ??   INR 2.4 2.5 2.1 2.3 2.5 2.1 2.6 2.7 1.1   Notes  1st of clinic      missed 1x dose amox     Date 7/13 7/17 7/23 7/27 8/27 9/7 10/16 10/31 11/16   Total Weekly Dose 27.5  mg 47.5 mg 35mg 35mg 40mg 40mg 37.5  mg 35mg 37.5mg   INR 1.5 2.3 2.8 2.3 3.2 3.8 3.3 1.5 2.39   Notes amox boost self adj   lost 20lbs   1x boost; r'ceived 11/20     Date 12/3 12/7 12/10 12/19 1/4/19 2/4 2/20 4/1 5/2   Total Weekly Dose 37.5mg 37.5mg 32.5mg 35mg 35mg 35mg 35mg 35mg 35mg   INR 3.7 3.9 3.1 2.5 2.3 2.0 2.4 2.7 2.3   Notes    sick          Date 6/26 8/2          Total Weekly Dose 35mg 35mg          INR 2.5 3.4          Notes   1x decr dose           Phone Interview:  Tablet Strength: 5mg and 7.5mg tablets  Patient Contact Info: [phone] **preferred**  Aleta (wife) is point of contact, her cell [phone]  Lab Contact Info: [facility] ([phone])    Patient Findings   Negatives:  Signs/symptoms of thrombosis, Signs/symptoms of bleeding, Laboratory test error suspected, Change in health, Change in alcohol use, Change in activity, Upcoming invasive procedure, Emergency department visit, Upcoming dental procedure, Missed doses, Extra doses, Change in medications, Change in diet/appetite, Hospital admission, Bruising, Other complaints   Comments:  Spoke with patient's wife who reports no changes since last encounter and denies  all findings. She did mention that she can't promise Mr. Johnson didn't miss a dose sometime ~2 weeks ago.     Plan:  1. INR is supratherapeutic today at 3.4, reason unclear. Instructed patient's wife to have patient decrease his warfarin dose to 2.5mg tonight then resume warfarin 5mg daily.  2. Repeat INR in 2 weeks to reassess.   3. Verbal information provided over the phone. Benjamín Johnson Jr RBV dosing instructions, expresses understanding by teach back, and has no further questions at this time.    Eitan Gray, PharmD, BCPS  8/2/2019  3:01 PM

## 2019-08-22 ENCOUNTER — TELEPHONE (OUTPATIENT)
Dept: PHARMACY | Facility: HOSPITAL | Age: 74
End: 2019-08-22

## 2019-08-22 NOTE — TELEPHONE ENCOUNTER
He is getting eye glasses at [facility] right now so Mrs. Johnson stated that she will try to check his PT/INR and post in the morning.

## 2019-08-23 ENCOUNTER — ANTICOAGULATION VISIT (OUTPATIENT)
Dept: PHARMACY | Facility: HOSPITAL | Age: 74
End: 2019-08-23

## 2019-08-23 DIAGNOSIS — I48.0 PAROXYSMAL ATRIAL FIBRILLATION (HCC): ICD-10-CM

## 2019-08-23 LAB — INR PPP: 3.8

## 2019-08-23 RX ORDER — LORATADINE 10 MG/1
10 TABLET ORAL DAILY
Status: ON HOLD | COMMUNITY
End: 2021-06-29

## 2019-08-23 RX ORDER — GUAIFENESIN 600 MG/1
1200 TABLET, EXTENDED RELEASE ORAL 2 TIMES DAILY PRN
COMMUNITY
End: 2021-06-30 | Stop reason: HOSPADM

## 2019-08-23 NOTE — PROGRESS NOTES
Anticoagulation Clinic - Remote Progress Note  [facility]  Frequency of Monitoring: every 4 weeks    Indication: paroxysmal afib   Referring Provider: MARCEL Bryan, Dr. Holt  Goal INR: 2.0-3.0  Current Drug Interactions: aspirin, fenofibrate, glyburide, levothyroxine, spironolactone  CHADS-VASc: 3 (age, DM, CHF)    Diet: wife says a salad a week, green beans sometimes  Alcohol: none  Tobacco: none  OTC Pain Medication: APAP prn     INR History:  Date 7/13 7/17 7/23 7/27 8/27 9/7 10/16 10/31 11/16   Total Weekly Dose 27.5  mg 47.5 mg 35mg 35mg 40mg 40mg 37.5  mg 35mg 37.5mg   INR 1.5 2.3 2.8 2.3 3.2 3.8 3.3 1.5 2.39   Notes amox boost self adj   lost 20lbs   1x boost; r'ceived 11/20     Date 12/3 12/7 12/10 12/19 1/4/19 2/4 2/20 4/1 5/2   Total Weekly Dose 37.5mg 37.5mg 32.5mg 35mg 35mg 35mg 35mg 35mg 35mg   INR 3.7 3.9 3.1 2.5 2.3 2.0 2.4 2.7 2.3   Notes    sick          Date 6/26 8/2 8/23         Total Weekly Dose 35mg 35mg 35 mg 32.5 mg        INR 2.5 3.4 3.8         Notes              Phone Interview:  Tablet Strength: 5mg and 7.5mg tablets  Patient Contact Info: [phone] **preferred**  Aleta (wife) is point of contact, her cell [phone]  Lab Contact Info: [facility] ([phone])    Patient Findings:  Positives:  Change in medications   Negatives:  Signs/symptoms of thrombosis, Signs/symptoms of bleeding, Laboratory test error suspected, Change in health, Change in alcohol use, Change in activity, Upcoming invasive procedure, Emergency department visit, Upcoming dental procedure, Missed doses, Extra doses, Change in diet/appetite, Hospital admission, Bruising, Other complaints   Comments:  Mr. Johnson started taking Claritin about one month ago, and he has been taking PRN Mucinex for chest congestion.     Plan:  1. INR is supratherapeutic again today -- Mr. Johnson is 1 week overdue to retest. Instructed Mrs. Johnson to have her  lower his dose 7% and take  warfarin 5mg daily except 2.5mg on Friday this week.  2. Repeat INR next Friday to ensure back WNL.   3. Verbal information provided over the phone. Benjamín Johnson Jr's wife RBV dosing instructions, expresses understanding by teach back, and has no further questions at this time.    Sherrie Tipton, PharmD  8/23/2019  2:23 PM

## 2019-09-05 ENCOUNTER — ANTICOAGULATION VISIT (OUTPATIENT)
Dept: PHARMACY | Facility: HOSPITAL | Age: 74
End: 2019-09-05

## 2019-09-05 DIAGNOSIS — I48.0 PAROXYSMAL ATRIAL FIBRILLATION (HCC): ICD-10-CM

## 2019-09-05 LAB — INR PPP: 4

## 2019-09-05 NOTE — PROGRESS NOTES
"Anticoagulation Clinic - Remote Progress Note  [facility]  Frequency of Monitoring: every 4 weeks    Indication: paroxysmal afib   Referring Provider: MARCEL Bryan, Dr. Holt  Goal INR: 2.0-3.0  Current Drug Interactions: aspirin, fenofibrate, glyburide, levothyroxine, spironolactone  CHADS-VASc: 3 (age, DM, CHF)    Diet: wife says a salad a week, green beans sometimes  Alcohol: none  Tobacco: none  OTC Pain Medication: APAP prn     INR History:  Date 7/13 7/17 7/23 7/27 8/27 9/7 10/16 10/31 11/16   Total Weekly Dose 27.5  mg 47.5 mg 35mg 35mg 40mg 40mg 37.5  mg 35mg 37.5mg   INR 1.5 2.3 2.8 2.3 3.2 3.8 3.3 1.5 2.39   Notes amox boost self adj   lost 20lbs   1x boost; r'ceived 11/20     Date 12/3 12/7 12/10 12/19 1/4/19 2/4 2/20 4/1 5/2   Total Weekly Dose 37.5mg 37.5mg 32.5mg 35mg 35mg 35mg 35mg 35mg 35mg   INR 3.7 3.9 3.1 2.5 2.3 2.0 2.4 2.7 2.3   Notes    sick          Date 6/26 8/2 8/23 9/4        Total Weekly Dose 35mg 35mg 35 mg 32.5 mg        INR 2.5 3.4 3.8  4        Notes     Self-held x1         Phone Interview:  Tablet Strength: 5mg and 7.5mg tablets  Patient Contact Info: [phone] **preferred**  Aleta (wife) is point of contact, her cell [phone]  Lab Contact Info: [facility] ([phone])    Patient Findings   Positives:  Change in diet/appetite, Bruising   Negatives:  Signs/symptoms of thrombosis, Signs/symptoms of bleeding, Laboratory test error suspected, Change in health, Change in alcohol use, Change in activity, Upcoming invasive procedure, Emergency department visit, Upcoming dental procedure, Missed doses, Extra doses, Change in medications, Hospital admission, Other complaints   Comments:  Mr. Johnson was due to recheck his INR on 9/30.  INR not rechecked until 9/4, and reported 9/5.   His wife stated that he has been eating a little bit more salads, but \"not much\"  He has been eating less sugar (candy, soda, etc)  and his blood glucose is doing " great per his wife.  His insulin dose has been reduced.     He is drinking more water.     He stated that his arms have quite a few bruises.  He has a blood blister. He is rough and tough per his wife and thinks that he hit his arm hard.  Encouraged them to follow up with PCP.  His wife stated that she is a nurse and will follow up if it starts looking worse.     Plan:  1. INR was supratherapeutic again last night at 4.  He self- held his dose last night.  Instructed Mrs. Johnson to have her take warfarin 5mg daily except 2.5mg on Friday this week until recheck on Monday  2. Repeat INR Monday, 9/9 to ensure back WNL. Will evaluate maintenance dosing needs as INR has been elevated since 8/2.  3. Verbal information provided over the phone. Benjamín Johnson Jr's wife RBV dosing instructions, expresses understanding by teach back, and has no further questions at this time.    Yomaira Briscoe, PharmD   9/5/2019  11:19 AM

## 2019-09-24 ENCOUNTER — ANTICOAGULATION VISIT (OUTPATIENT)
Dept: PHARMACY | Facility: HOSPITAL | Age: 74
End: 2019-09-24

## 2019-09-24 DIAGNOSIS — I48.0 PAROXYSMAL ATRIAL FIBRILLATION (HCC): ICD-10-CM

## 2019-09-24 LAB — INR PPP: 3

## 2019-09-24 NOTE — PROGRESS NOTES
Anticoagulation Clinic - Remote Progress Note  [facility]  Frequency of Monitoring: every 4 weeks    Indication: paroxysmal afib   Referring Provider: MARCEL Bryan, Dr. Holt  Goal INR: 2.0-3.0  Current Drug Interactions: aspirin, fenofibrate, glyburide, levothyroxine, spironolactone  CHADS-VASc: 3 (age, DM, CHF)    Diet: wife says a salad a week, green beans sometimes 9/24/19  Alcohol: none  Tobacco: none  OTC Pain Medication: APAP prn     INR History:  Date 7/13 7/17 7/23 7/27 8/27 9/7 10/16 10/31 11/16   Total Weekly Dose 27.5  mg 47.5 mg 35mg 35mg 40mg 40mg 37.5  mg 35mg 37.5mg   INR 1.5 2.3 2.8 2.3 3.2 3.8 3.3 1.5 2.39   Notes amox boost self adj   lost 20lbs   1x boost; r'ceived 11/20     Date 12/3 12/7 12/10 12/19 1/4/19 2/4 2/20 4/1 5/2   Total Weekly Dose 37.5mg 37.5mg 32.5mg 35mg 35mg 35mg 35mg 35mg 35mg   INR 3.7 3.9 3.1 2.5 2.3 2.0 2.4 2.7 2.3   Notes    sick          Date 6/26 8/2 8/23 9/4 9/24       Total Weekly Dose 35mg 35mg 35 mg 32.5 mg 32.5mg       INR 2.5 3.4 3.8  4 3.0       Notes     Self-held x1         Phone Interview:  Tablet Strength: 5mg and 7.5mg tablets  Patient Contact Info: [phone] **preferred**  Aleta (wife) is point of contact, her cell [phone]  Lab Contact Info: [facility] ([phone])    Patient Findings:  Positives:  Bruising   Negatives:  Signs/symptoms of thrombosis, Signs/symptoms of bleeding, Laboratory test error suspected, Change in health, Change in alcohol use, Change in activity, Upcoming invasive procedure, Emergency department visit, Upcoming dental procedure, Missed doses, Extra doses, Change in medications, Change in diet/appetite, Hospital admission, Other complaints   Comments:  Spoke with patient spouse, Aleta. She denies any changes although states when his INR was high he did have some extensive bruising. Advised her the importance of timely INR checks so we may monitor closer especially when his readings have  been out of range recently. She verbalizes understanding and risks associated with sub and supra therapeutic INRs.      Plan:  1. INR is back WNL although at top of goal range. Instructed Mrs. Johnson to have patient take warfarin 5mg daily except 2.5mg on Fridays until recheck.  2. Repeat INR in two weeks. Patient has historically been non-compliant with timely follow-ups.  3. Verbal information provided over the phone. Benjamín Johnson Jr's wife RBV dosing instructions, expresses understanding by teach back, and has no further questions at this time.    Jenine Parekh, [place]   9/24/2019  2:49 PM     I, Suzie Stephenson, PharmD, have reviewed the note in full and agree with the assessment and plan.  09/24/19  4:37 PM

## 2019-10-18 ENCOUNTER — TELEPHONE (OUTPATIENT)
Dept: PHARMACY | Facility: HOSPITAL | Age: 74
End: 2019-10-18

## 2019-10-18 NOTE — TELEPHONE ENCOUNTER
Spoke with patient re:overdue PT/INR self test on home monitor. Patient reports he will try to self test on Mon, 10/21.

## 2019-10-28 LAB — INR PPP: 3.1

## 2019-10-29 ENCOUNTER — ANTICOAGULATION VISIT (OUTPATIENT)
Dept: PHARMACY | Facility: HOSPITAL | Age: 74
End: 2019-10-29

## 2019-10-29 DIAGNOSIS — I48.0 PAROXYSMAL ATRIAL FIBRILLATION (HCC): ICD-10-CM

## 2019-10-29 NOTE — PROGRESS NOTES
"Anticoagulation Clinic - Remote Progress Note  [facility]  Frequency of Monitoring: every 4 weeks    Indication: paroxysmal afib   Referring Provider: MARCEL Bryan, Dr. Holt  Goal INR: 2.0-3.0  Current Drug Interactions: aspirin, fenofibrate, glyburide, levothyroxine, spironolactone  CHADS-VASc: 3 (age, DM, CHF)    Diet: wife says a salad a week, green beans sometimes 9/24/19  Alcohol: none  Tobacco: none  OTC Pain Medication: APAP prn     INR History:  Date 7/13 7/17 7/23 7/27 8/27 9/7 10/16 10/31 11/16   Total Weekly Dose 27.5  mg 47.5 mg 35mg 35mg 40mg 40mg 37.5  mg 35mg 37.5mg   INR 1.5 2.3 2.8 2.3 3.2 3.8 3.3 1.5 2.39   Notes amox boost self adj   lost 20lbs   1x boost; r'ceived 11/20     Date 12/3 12/7 12/10 12/19 1/4/19 2/4 2/20 4/1 5/2   Total Weekly Dose 37.5mg 37.5mg 32.5mg 35mg 35mg 35mg 35mg 35mg 35mg   INR 3.7 3.9 3.1 2.5 2.3 2.0 2.4 2.7 2.3   Notes    sick          Date 6/26 8/2 8/23 9/4 9/24 10/28      Total Weekly Dose 35mg 35mg 35 mg 32.5 mg 32.5mg 32.5 mg      INR 2.5 3.4 3.8 4.0 3.0 3.1      Notes     self-held x1  incr GLV       Phone Interview:  Tablet Strength: 5mg and 7.5mg tablets  Patient Contact Info: [phone] **preferred**  Aleta (wife) is point of contact, her cell [phone]  Lab Contact Info: [facility] ([phone])    Patient Findings:  Negatives:  Signs/symptoms of thrombosis, Signs/symptoms of bleeding, Laboratory test error suspected, Change in health, Change in alcohol use, Change in activity, Upcoming invasive procedure, Emergency department visit, Upcoming dental procedure, Missed doses, Extra doses, Change in medications, Change in diet/appetite, Hospital admission, Bruising, Other complaints   Comments:  Mrs. Johnson denied any recent change in her 's medications, diet, or health. She notes he hasn't been eating \"any\" green leafy veggies, but he would be willing to do so.      Plan:  1. INR was slightly supratherapeutic " yesterday, stable since last check. Instructed Mrs. Johnson to have her  start eating 1 serving of green beans each week and continue warfarin 5mg daily except 2.5mg on Friday.  2. Repeat INR in two weeks. Patient has historically been non-compliant with timely follow-ups.  3. Verbal information provided over the phone. Benjamín Johnson Jr's wife RBV dosing instructions, expresses understanding by teach back, and has no further questions at this time.    Sherrie Tipton, PharmD  10/29/2019  8:23 AM

## 2019-11-21 ENCOUNTER — TELEPHONE (OUTPATIENT)
Dept: PHARMACY | Facility: HOSPITAL | Age: 74
End: 2019-11-21

## 2019-11-21 NOTE — TELEPHONE ENCOUNTER
Spoke with Mr Johnson re: overdue INR- states he will check first thing in the AM. Patient historically non compliant with INR retests

## 2019-11-22 ENCOUNTER — ANTICOAGULATION VISIT (OUTPATIENT)
Dept: PHARMACY | Facility: HOSPITAL | Age: 74
End: 2019-11-22

## 2019-11-22 DIAGNOSIS — I48.0 PAROXYSMAL ATRIAL FIBRILLATION (HCC): ICD-10-CM

## 2019-11-22 LAB — INR PPP: 2.2

## 2019-11-22 NOTE — PROGRESS NOTES
Anticoagulation Clinic - Remote Progress Note  [facility]  Frequency of Monitorin-4x a month    Indication: paroxysmal afib   Referring Provider: MARCEL Bryan, Dr. Holt  Goal INR: 2.0-3.0  Current Drug Interactions: aspirin, fenofibrate, glyburide, levothyroxine, spironolactone  CHADS-VASc: 3 (age, DM, CHF)    Diet: wife says a salad a week, green beans sometimes 19  Alcohol: none  Tobacco: none  OTC Pain Medication: APAP prn     INR History:  Date 7/13 7/17 7/23 7/27 8/27 9/7 10/16 10/31 11/16   Total Weekly Dose 27.5  mg 47.5 mg 35mg 35mg 40mg 40mg 37.5  mg 35mg 37.5mg   INR 1.5 2.3 2.8 2.3 3.2 3.8 3.3 1.5 2.39   Notes amox boost self adj   lost 20lbs   1x boost; r'ceived      Date 12/3 12/7 12/10 12/19 1/4/19 2/4 2/20 4/1 5/2   Total Weekly Dose 37.5mg 37.5mg 32.5mg 35mg 35mg 35mg 35mg 35mg 35mg   INR 3.7 3.9 3.1 2.5 2.3 2.0 2.4 2.7 2.3   Notes    sick          Date  9/4 9/24 10/28 11/22     Total Weekly Dose 35mg 35mg 35mg 32.5 mg 32.5  mg 32.5 mg 32.5  mg     INR 2.5 3.4 3.8 4.0 3.0 3.1 2.2     Notes     self-held x1  incr GLV       Phone Interview:  Tablet Strength: 5mg and 7.5mg tablets  Patient Contact Info: [phone] **preferred**  Aleta (wife) is point of contact, her cell [phone]  Lab Contact Info: [facility] ([phone])    Patient Findings   Negatives:  Signs/symptoms of thrombosis, Signs/symptoms of bleeding, Laboratory test error suspected, Change in health, Change in alcohol use, Change in activity, Upcoming invasive procedure, Emergency department visit, Upcoming dental procedure, Missed doses, Extra doses, Change in medications, Change in diet/appetite, Hospital admission, Bruising, Other complaints   Comments:  Patient continues to eat 1x serving of green beans a week. Otherwise denies all findings.     Aleta had me transfer her to [facility] after our conversation today. She admits he has not seen a cardiologist in some  time, would like to get him reestablished at [facility]. Patient used to see Dr Holt.     Plan:  1. INR is therapeutic today at 2.2. Patient was non-compliant with requested follow up date. Instructed Mrs. Johnson to have her  continue warfarin 5mg daily except 2.5mg on Friday.  2. Repeat INR in 4 weeks. Patient has historically been non-compliant with timely follow-ups.  3. Verbal information provided over the phone. Benjamín Johnson Jr's wife RBV dosing instructions, expresses understanding by teach back, and has no further questions at this time.    Keaton Perez CPhT  11/22/2019  1:22 PM      IShruthi, [facility], have reviewed the note in full and agree with the assessment and plan.  11/22/19  1:34 PM

## 2020-01-09 ENCOUNTER — TELEPHONE (OUTPATIENT)
Dept: PHARMACY | Facility: HOSPITAL | Age: 75
End: 2020-01-09

## 2020-01-21 ENCOUNTER — ANTICOAGULATION VISIT (OUTPATIENT)
Dept: PHARMACY | Facility: HOSPITAL | Age: 75
End: 2020-01-21

## 2020-01-21 DIAGNOSIS — I48.0 PAROXYSMAL ATRIAL FIBRILLATION (HCC): ICD-10-CM

## 2020-01-21 LAB — INR PPP: 2.9

## 2020-01-21 NOTE — TELEPHONE ENCOUNTER
Spoke with patient's spouse, Aleta, re: overdue PT/INR self test on home monitor. She says she will have him test today.    Will send 1st letter if INR is not received by tomorrow morning.

## 2020-01-21 NOTE — PROGRESS NOTES
Anticoagulation Clinic - Remote Progress Note  [facility]  Frequency of Monitorin-4x a month    Indication: paroxysmal afib   Referring Provider: MARCEL Bryan, Dr. Holt  Goal INR: 2.0-3.0  Current Drug Interactions: aspirin, fenofibrate, glyburide, levothyroxine, spironolactone  CHADS-VASc: 3 (age, DM, CHF)    Diet: wife says a salad a week, green beans sometimes 20  Alcohol: none  Tobacco: none  OTC Pain Medication: APAP prn     INR History:  Date 7/13 7/17 7/23 7/27 8/27 9/7 10/16 10/31 11/16   Total Weekly Dose 27.5  mg 47.5 mg 35mg 35mg 40mg 40mg 37.5  mg 35mg 37.5mg   INR 1.5 2.3 2.8 2.3 3.2 3.8 3.3 1.5 2.39   Notes amox boost self adj   lost 20lbs   1x boost; r'ceived      Date 12/3 12/7 12/10 12/19 1/4/19 2/4 2/20 4/1 5/2   Total Weekly Dose 37.5mg 37.5mg 32.5mg 35mg 35mg 35mg 35mg 35mg 35mg   INR 3.7 3.9 3.1 2.5 2.3 2.0 2.4 2.7 2.3   Notes    sick          Date  9/4 9/24 10/28 11/22 1/21/20    Total Weekly Dose 35mg 35mg 35mg 32.5 mg 32.5  mg 32.5 mg 32.5  mg 32.5mg    INR 2.5 3.4 3.8 4.0 3.0 3.1 2.2 2.9    Notes     self-held x1  incr GLV less GLV      Phone Interview:  Tablet Strength: 5mg and 7.5mg tablets  Patient Contact Info: [phone] **preferred**  Aleta (wife) is point of contact, her cell [phone]  Lab Contact Info: [facility] ([phone])    Patient Findings:  Positives:  Change in medications, Change in diet/appetite   Negatives:  Signs/symptoms of thrombosis, Signs/symptoms of bleeding, Laboratory test error suspected, Change in health, Change in alcohol use, Change in activity, Upcoming invasive procedure, Emergency department visit, Upcoming dental procedure, Missed doses, Extra doses, Hospital admission, Bruising, Other complaints   Comments:  Mrs. Johnson states Mr. Johnson didn't have his weekly serving of green beans last week. She also reports he took some Nyquil last night for a cough. They weren't able to give me  an exact list of ingredients at this time. They don't plan to continue the cough syrup at this point and they will return to normal GLV intake.     Plan:  1. INR is therapeutic today at 2.9. Patient was non-compliant with requested follow up date. Instructed Mrs. Johnson to have her  continue warfarin 5mg daily except 2.5mg on Friday.  2. Repeat INR in 4 weeks. Patient has historically been non-compliant with timely follow-ups.  3. Verbal information provided over the phone. Benjamín Johnson Jr's wife RBV dosing instructions, expresses understanding by teach back, and has no further questions at this time.  4. Patient spouse and patient made aware he will need to make a cardiology appointment in order for us to continue to manage his warfarin therapy. Transferred them to [facility] to see which physician came see him in [facility].    Jennie Parekh, [facility]  1/21/2020  2:31 PM    Sherrie HASTINGS, [facility], have reviewed the note in full and agree with the assessment and plan.  01/21/20  3:25 PM

## 2020-02-25 ENCOUNTER — TELEPHONE (OUTPATIENT)
Dept: PHARMACY | Facility: HOSPITAL | Age: 75
End: 2020-02-25

## 2020-02-27 NOTE — TELEPHONE ENCOUNTER
Spoke with Jagdish Grubbs at . Confirmed patient is followed by Dr. Ramirez at  and will now have his INR monitored with 's anticoagulation clinic. Patient is aware and has spoken with Jagdish.     Closing encounter at this time.

## 2021-06-28 ENCOUNTER — APPOINTMENT (OUTPATIENT)
Dept: GENERAL RADIOLOGY | Facility: HOSPITAL | Age: 76
End: 2021-06-28

## 2021-06-28 ENCOUNTER — HOSPITAL ENCOUNTER (OUTPATIENT)
Facility: HOSPITAL | Age: 76
Setting detail: OBSERVATION
Discharge: HOME | End: 2021-06-30
Attending: EMERGENCY MEDICINE | Admitting: INTERNAL MEDICINE

## 2021-06-28 DIAGNOSIS — R06.89 DYSPNEA AND RESPIRATORY ABNORMALITIES: ICD-10-CM

## 2021-06-28 DIAGNOSIS — I50.9 ACUTE ON CHRONIC CONGESTIVE HEART FAILURE, UNSPECIFIED HEART FAILURE TYPE (HCC): Primary | ICD-10-CM

## 2021-06-28 DIAGNOSIS — R06.00 DYSPNEA AND RESPIRATORY ABNORMALITIES: ICD-10-CM

## 2021-06-28 DIAGNOSIS — N28.9 RENAL INSUFFICIENCY: ICD-10-CM

## 2021-06-28 DIAGNOSIS — J90 PLEURAL EFFUSION: ICD-10-CM

## 2021-06-28 PROBLEM — I25.10 CAD (CORONARY ARTERY DISEASE): Status: ACTIVE | Noted: 2021-06-28

## 2021-06-28 PROBLEM — E03.9 HYPOTHYROID: Status: ACTIVE | Noted: 2021-06-28

## 2021-06-28 PROBLEM — N18.30 CKD (CHRONIC KIDNEY DISEASE) STAGE 3, GFR 30-59 ML/MIN (HCC): Status: ACTIVE | Noted: 2021-06-28

## 2021-06-28 PROBLEM — R79.89 ELEVATED SERUM CREATININE: Status: ACTIVE | Noted: 2021-06-28

## 2021-06-28 LAB
ALBUMIN SERPL-MCNC: 3.8 G/DL (ref 3.5–5.2)
ALBUMIN/GLOB SERPL: 1.2 G/DL
ALP SERPL-CCNC: 42 U/L (ref 39–117)
ALT SERPL W P-5'-P-CCNC: 26 U/L (ref 1–41)
ANION GAP SERPL CALCULATED.3IONS-SCNC: 8 MMOL/L (ref 5–15)
AST SERPL-CCNC: 36 U/L (ref 1–40)
BASOPHILS # BLD AUTO: 0.05 10*3/MM3 (ref 0–0.2)
BASOPHILS NFR BLD AUTO: 0.6 % (ref 0–1.5)
BILIRUB SERPL-MCNC: 1 MG/DL (ref 0–1.2)
BUN SERPL-MCNC: 19 MG/DL (ref 8–23)
BUN/CREAT SERPL: 11.8 (ref 7–25)
CALCIUM SPEC-SCNC: 9.6 MG/DL (ref 8.6–10.5)
CHLORIDE SERPL-SCNC: 102 MMOL/L (ref 98–107)
CO2 SERPL-SCNC: 25 MMOL/L (ref 22–29)
CREAT SERPL-MCNC: 1.61 MG/DL (ref 0.76–1.27)
DEPRECATED RDW RBC AUTO: 50 FL (ref 37–54)
EOSINOPHIL # BLD AUTO: 0.47 10*3/MM3 (ref 0–0.4)
EOSINOPHIL NFR BLD AUTO: 5.2 % (ref 0.3–6.2)
ERYTHROCYTE [DISTWIDTH] IN BLOOD BY AUTOMATED COUNT: 15 % (ref 12.3–15.4)
FLUAV RNA RESP QL NAA+PROBE: NOT DETECTED
FLUBV RNA RESP QL NAA+PROBE: NOT DETECTED
GFR SERPL CREATININE-BSD FRML MDRD: 42 ML/MIN/1.73
GLOBULIN UR ELPH-MCNC: 3.1 GM/DL
GLUCOSE BLDC GLUCOMTR-MCNC: 128 MG/DL (ref 70–130)
GLUCOSE SERPL-MCNC: 176 MG/DL (ref 65–99)
HCT VFR BLD AUTO: 41 % (ref 37.5–51)
HGB BLD-MCNC: 13 G/DL (ref 13–17.7)
HOLD SPECIMEN: NORMAL
IMM GRANULOCYTES # BLD AUTO: 0.05 10*3/MM3 (ref 0–0.05)
IMM GRANULOCYTES NFR BLD AUTO: 0.6 % (ref 0–0.5)
INR PPP: 2.97 (ref 0.85–1.16)
LYMPHOCYTES # BLD AUTO: 1.14 10*3/MM3 (ref 0.7–3.1)
LYMPHOCYTES NFR BLD AUTO: 12.6 % (ref 19.6–45.3)
MCH RBC QN AUTO: 29.1 PG (ref 26.6–33)
MCHC RBC AUTO-ENTMCNC: 31.7 G/DL (ref 31.5–35.7)
MCV RBC AUTO: 91.9 FL (ref 79–97)
MONOCYTES # BLD AUTO: 0.88 10*3/MM3 (ref 0.1–0.9)
MONOCYTES NFR BLD AUTO: 9.7 % (ref 5–12)
NEUTROPHILS NFR BLD AUTO: 6.46 10*3/MM3 (ref 1.7–7)
NEUTROPHILS NFR BLD AUTO: 71.3 % (ref 42.7–76)
NRBC BLD AUTO-RTO: 0 /100 WBC (ref 0–0.2)
NT-PROBNP SERPL-MCNC: 2915 PG/ML (ref 0–1800)
PLATELET # BLD AUTO: 221 10*3/MM3 (ref 140–450)
PMV BLD AUTO: 10.8 FL (ref 6–12)
POTASSIUM SERPL-SCNC: 5 MMOL/L (ref 3.5–5.2)
PROT SERPL-MCNC: 6.9 G/DL (ref 6–8.5)
PROTHROMBIN TIME: 29.6 SECONDS (ref 11.4–14.4)
QT INTERVAL: 390 MS
QTC INTERVAL: 464 MS
RBC # BLD AUTO: 4.46 10*6/MM3 (ref 4.14–5.8)
SARS-COV-2 RNA RESP QL NAA+PROBE: NOT DETECTED
SODIUM SERPL-SCNC: 135 MMOL/L (ref 136–145)
TROPONIN T SERPL-MCNC: <0.01 NG/ML (ref 0–0.03)
WBC # BLD AUTO: 9.05 10*3/MM3 (ref 3.4–10.8)
WHOLE BLOOD HOLD SPECIMEN: NORMAL

## 2021-06-28 PROCEDURE — C9803 HOPD COVID-19 SPEC COLLECT: HCPCS

## 2021-06-28 PROCEDURE — 71045 X-RAY EXAM CHEST 1 VIEW: CPT

## 2021-06-28 PROCEDURE — 25010000002 FUROSEMIDE PER 20 MG: Performed by: EMERGENCY MEDICINE

## 2021-06-28 PROCEDURE — 83880 ASSAY OF NATRIURETIC PEPTIDE: CPT

## 2021-06-28 PROCEDURE — 99284 EMERGENCY DEPT VISIT MOD MDM: CPT

## 2021-06-28 PROCEDURE — G0378 HOSPITAL OBSERVATION PER HR: HCPCS

## 2021-06-28 PROCEDURE — 80053 COMPREHEN METABOLIC PANEL: CPT

## 2021-06-28 PROCEDURE — 82962 GLUCOSE BLOOD TEST: CPT

## 2021-06-28 PROCEDURE — 87636 SARSCOV2 & INF A&B AMP PRB: CPT | Performed by: NURSE PRACTITIONER

## 2021-06-28 PROCEDURE — 93005 ELECTROCARDIOGRAM TRACING: CPT | Performed by: EMERGENCY MEDICINE

## 2021-06-28 PROCEDURE — 84484 ASSAY OF TROPONIN QUANT: CPT

## 2021-06-28 PROCEDURE — 99219 PR INITIAL OBSERVATION CARE/DAY 50 MINUTES: CPT | Performed by: NURSE PRACTITIONER

## 2021-06-28 PROCEDURE — 85025 COMPLETE CBC W/AUTO DIFF WBC: CPT

## 2021-06-28 PROCEDURE — 96374 THER/PROPH/DIAG INJ IV PUSH: CPT

## 2021-06-28 PROCEDURE — 93005 ELECTROCARDIOGRAM TRACING: CPT

## 2021-06-28 PROCEDURE — 85610 PROTHROMBIN TIME: CPT | Performed by: EMERGENCY MEDICINE

## 2021-06-28 RX ORDER — DEXTROSE MONOHYDRATE 25 G/50ML
25 INJECTION, SOLUTION INTRAVENOUS
Status: DISCONTINUED | OUTPATIENT
Start: 2021-06-28 | End: 2021-06-30 | Stop reason: HOSPADM

## 2021-06-28 RX ORDER — ASPIRIN 325 MG
325 TABLET ORAL DAILY
Status: DISCONTINUED | OUTPATIENT
Start: 2021-06-29 | End: 2021-06-30 | Stop reason: HOSPADM

## 2021-06-28 RX ORDER — FENOFIBRATE 145 MG/1
145 TABLET, COATED ORAL DAILY
Status: DISCONTINUED | OUTPATIENT
Start: 2021-06-29 | End: 2021-06-30 | Stop reason: HOSPADM

## 2021-06-28 RX ORDER — WARFARIN SODIUM 5 MG/1
5 TABLET ORAL
Status: DISCONTINUED | OUTPATIENT
Start: 2021-06-29 | End: 2021-06-29

## 2021-06-28 RX ORDER — NICOTINE POLACRILEX 4 MG
15 LOZENGE BUCCAL
Status: DISCONTINUED | OUTPATIENT
Start: 2021-06-28 | End: 2021-06-30 | Stop reason: HOSPADM

## 2021-06-28 RX ORDER — LEVOTHYROXINE SODIUM 0.12 MG/1
125 TABLET ORAL
Status: DISCONTINUED | OUTPATIENT
Start: 2021-06-29 | End: 2021-06-30 | Stop reason: HOSPADM

## 2021-06-28 RX ORDER — SODIUM CHLORIDE 0.9 % (FLUSH) 0.9 %
10 SYRINGE (ML) INJECTION AS NEEDED
Status: DISCONTINUED | OUTPATIENT
Start: 2021-06-28 | End: 2021-06-30 | Stop reason: HOSPADM

## 2021-06-28 RX ORDER — SPIRONOLACTONE 25 MG/1
25 TABLET ORAL DAILY
Status: DISCONTINUED | OUTPATIENT
Start: 2021-06-29 | End: 2021-06-30 | Stop reason: HOSPADM

## 2021-06-28 RX ORDER — FUROSEMIDE 10 MG/ML
40 INJECTION INTRAMUSCULAR; INTRAVENOUS ONCE
Status: COMPLETED | OUTPATIENT
Start: 2021-06-28 | End: 2021-06-28

## 2021-06-28 RX ORDER — METHOCARBAMOL 500 MG/1
500 TABLET, FILM COATED ORAL 3 TIMES DAILY
COMMUNITY
Start: 2021-06-15 | End: 2021-06-30 | Stop reason: HOSPADM

## 2021-06-28 RX ORDER — WARFARIN SODIUM 2.5 MG/1
2.5 TABLET ORAL
Status: DISCONTINUED | OUTPATIENT
Start: 2021-06-28 | End: 2021-06-30 | Stop reason: HOSPADM

## 2021-06-28 RX ORDER — CETIRIZINE HYDROCHLORIDE 10 MG/1
10 TABLET ORAL DAILY
Status: DISCONTINUED | OUTPATIENT
Start: 2021-06-29 | End: 2021-06-30 | Stop reason: HOSPADM

## 2021-06-28 RX ORDER — CARVEDILOL 12.5 MG/1
12.5 TABLET ORAL 2 TIMES DAILY WITH MEALS
Status: DISCONTINUED | OUTPATIENT
Start: 2021-06-28 | End: 2021-06-30 | Stop reason: HOSPADM

## 2021-06-28 RX ORDER — SODIUM CHLORIDE 0.9 % (FLUSH) 0.9 %
10 SYRINGE (ML) INJECTION EVERY 12 HOURS SCHEDULED
Status: DISCONTINUED | OUTPATIENT
Start: 2021-06-28 | End: 2021-06-30 | Stop reason: HOSPADM

## 2021-06-28 RX ORDER — GABAPENTIN 300 MG/1
300 CAPSULE ORAL 3 TIMES DAILY
Status: DISCONTINUED | OUTPATIENT
Start: 2021-06-28 | End: 2021-06-28

## 2021-06-28 RX ORDER — ATORVASTATIN CALCIUM 20 MG/1
20 TABLET, FILM COATED ORAL DAILY
Status: DISCONTINUED | OUTPATIENT
Start: 2021-06-29 | End: 2021-06-30 | Stop reason: HOSPADM

## 2021-06-28 RX ORDER — GABAPENTIN 300 MG/1
900 CAPSULE ORAL NIGHTLY
Status: DISCONTINUED | OUTPATIENT
Start: 2021-06-28 | End: 2021-06-30 | Stop reason: HOSPADM

## 2021-06-28 RX ORDER — HYDROCODONE BITARTRATE AND ACETAMINOPHEN 5; 325 MG/1; MG/1
1 TABLET ORAL EVERY 6 HOURS PRN
Status: DISCONTINUED | OUTPATIENT
Start: 2021-06-28 | End: 2021-06-30 | Stop reason: HOSPADM

## 2021-06-28 RX ORDER — WARFARIN SODIUM 5 MG/1
5 TABLET ORAL
Status: DISCONTINUED | OUTPATIENT
Start: 2021-06-29 | End: 2021-06-28

## 2021-06-28 RX ORDER — LEVOCETIRIZINE DIHYDROCHLORIDE 5 MG/1
5 TABLET, FILM COATED ORAL EVERY EVENING
Status: ON HOLD | COMMUNITY
Start: 2021-05-26 | End: 2021-07-24

## 2021-06-28 RX ADMIN — WARFARIN SODIUM 2.5 MG: 2.5 TABLET ORAL at 22:47

## 2021-06-28 RX ADMIN — FUROSEMIDE 40 MG: 10 INJECTION, SOLUTION INTRAMUSCULAR; INTRAVENOUS at 17:21

## 2021-06-28 RX ADMIN — GABAPENTIN 900 MG: 300 CAPSULE ORAL at 22:47

## 2021-06-28 RX ADMIN — CARVEDILOL 12.5 MG: 12.5 TABLET, FILM COATED ORAL at 22:47

## 2021-06-28 RX ADMIN — SODIUM CHLORIDE, PRESERVATIVE FREE 10 ML: 5 INJECTION INTRAVENOUS at 22:48

## 2021-06-29 ENCOUNTER — APPOINTMENT (OUTPATIENT)
Dept: CARDIOLOGY | Facility: HOSPITAL | Age: 76
End: 2021-06-29

## 2021-06-29 LAB
ANION GAP SERPL CALCULATED.3IONS-SCNC: 9 MMOL/L (ref 5–15)
ASCENDING AORTA: 3.3 CM
BACTERIA UR QL AUTO: NORMAL /HPF
BASOPHILS # BLD AUTO: 0.05 10*3/MM3 (ref 0–0.2)
BASOPHILS NFR BLD AUTO: 0.6 % (ref 0–1.5)
BH CV ECHO MEAS - AO MAX PG (FULL): 1.5 MMHG
BH CV ECHO MEAS - AO MAX PG: 3.1 MMHG
BH CV ECHO MEAS - AO MEAN PG (FULL): 0.59 MMHG
BH CV ECHO MEAS - AO MEAN PG: 1.6 MMHG
BH CV ECHO MEAS - AO ROOT AREA (BSA CORRECTED): 1.5
BH CV ECHO MEAS - AO ROOT AREA: 11.2 CM^2
BH CV ECHO MEAS - AO ROOT DIAM: 3.8 CM
BH CV ECHO MEAS - AO V2 MAX: 87.4 CM/SEC
BH CV ECHO MEAS - AO V2 MEAN: 58.8 CM/SEC
BH CV ECHO MEAS - AO V2 VTI: 15.3 CM
BH CV ECHO MEAS - ASC AORTA: 3.3 CM
BH CV ECHO MEAS - AVA(I,A): 2.6 CM^2
BH CV ECHO MEAS - AVA(I,D): 2.6 CM^2
BH CV ECHO MEAS - AVA(V,A): 2.2 CM^2
BH CV ECHO MEAS - AVA(V,D): 2.2 CM^2
BH CV ECHO MEAS - BSA(HAYCOCK): 2.6 M^2
BH CV ECHO MEAS - BSA: 2.5 M^2
BH CV ECHO MEAS - BZI_BMI: 34.8 KILOGRAMS/M^2
BH CV ECHO MEAS - BZI_METRIC_HEIGHT: 188 CM
BH CV ECHO MEAS - BZI_METRIC_WEIGHT: 122.9 KG
BH CV ECHO MEAS - EDV(CUBED): 218.5 ML
BH CV ECHO MEAS - EDV(MOD-SP2): 90 ML
BH CV ECHO MEAS - EDV(MOD-SP4): 143 ML
BH CV ECHO MEAS - EDV(TEICH): 181.6 ML
BH CV ECHO MEAS - EF(CUBED): 42.3 %
BH CV ECHO MEAS - EF(MOD-BP): 32 %
BH CV ECHO MEAS - EF(MOD-SP2): 40 %
BH CV ECHO MEAS - EF(MOD-SP4): 23.1 %
BH CV ECHO MEAS - EF(TEICH): 34.4 %
BH CV ECHO MEAS - ESV(CUBED): 126.1 ML
BH CV ECHO MEAS - ESV(MOD-SP2): 54 ML
BH CV ECHO MEAS - ESV(MOD-SP4): 110 ML
BH CV ECHO MEAS - ESV(TEICH): 119.1 ML
BH CV ECHO MEAS - FS: 16.7 %
BH CV ECHO MEAS - IVS/LVPW: 1
BH CV ECHO MEAS - IVSD: 1.1 CM
BH CV ECHO MEAS - LA DIMENSION: 4.9 CM
BH CV ECHO MEAS - LA/AO: 1.3
BH CV ECHO MEAS - LAD MAJOR: 7.3 CM
BH CV ECHO MEAS - LAT PEAK E' VEL: 8.1 CM/SEC
BH CV ECHO MEAS - LATERAL E/E' RATIO: 12.5
BH CV ECHO MEAS - LV DIASTOLIC VOL/BSA (35-75): 57.8 ML/M^2
BH CV ECHO MEAS - LV IVRT: 0.08 SEC
BH CV ECHO MEAS - LV MASS(C)D: 273.8 GRAMS
BH CV ECHO MEAS - LV MASS(C)DI: 110.7 GRAMS/M^2
BH CV ECHO MEAS - LV MAX PG: 1.6 MMHG
BH CV ECHO MEAS - LV MEAN PG: 1 MMHG
BH CV ECHO MEAS - LV SYSTOLIC VOL/BSA (12-30): 44.5 ML/M^2
BH CV ECHO MEAS - LV V1 MAX: 63.1 CM/SEC
BH CV ECHO MEAS - LV V1 MEAN: 47.8 CM/SEC
BH CV ECHO MEAS - LV V1 VTI: 13.1 CM
BH CV ECHO MEAS - LVIDD: 6 CM
BH CV ECHO MEAS - LVIDS: 5 CM
BH CV ECHO MEAS - LVLD AP2: 7.6 CM
BH CV ECHO MEAS - LVLD AP4: 7.6 CM
BH CV ECHO MEAS - LVLS AP2: 7.5 CM
BH CV ECHO MEAS - LVLS AP4: 7.5 CM
BH CV ECHO MEAS - LVOT AREA (M): 3.1 CM^2
BH CV ECHO MEAS - LVOT AREA: 3.1 CM^2
BH CV ECHO MEAS - LVOT DIAM: 2 CM
BH CV ECHO MEAS - LVPWD: 1.1 CM
BH CV ECHO MEAS - MED PEAK E' VEL: 5.6 CM/SEC
BH CV ECHO MEAS - MEDIAL E/E' RATIO: 18.1
BH CV ECHO MEAS - MR MAX PG: 71 MMHG
BH CV ECHO MEAS - MR MAX VEL: 418.2 CM/SEC
BH CV ECHO MEAS - MV DEC SLOPE: 392.3 CM/SEC^2
BH CV ECHO MEAS - MV DEC TIME: 0.17 SEC
BH CV ECHO MEAS - MV E MAX VEL: 103.2 CM/SEC
BH CV ECHO MEAS - MV P1/2T MAX VEL: 111.3 CM/SEC
BH CV ECHO MEAS - MV P1/2T: 83.1 MSEC
BH CV ECHO MEAS - MVA P1/2T LCG: 2 CM^2
BH CV ECHO MEAS - MVA(P1/2T): 2.6 CM^2
BH CV ECHO MEAS - PA ACC SLOPE: 230.3 CM/SEC^2
BH CV ECHO MEAS - PA ACC TIME: 0.2 SEC
BH CV ECHO MEAS - PA MAX PG: 1.7 MMHG
BH CV ECHO MEAS - PA V2 MAX: 65.2 CM/SEC
BH CV ECHO MEAS - PI END-D VEL: 84.7 CM/SEC
BH CV ECHO MEAS - RAP SYSTOLE: 8 MMHG
BH CV ECHO MEAS - RVSP: 59 MMHG
BH CV ECHO MEAS - SI(AO): 69.4 ML/M^2
BH CV ECHO MEAS - SI(CUBED): 37.3 ML/M^2
BH CV ECHO MEAS - SI(LVOT): 16.4 ML/M^2
BH CV ECHO MEAS - SI(MOD-SP2): 14.6 ML/M^2
BH CV ECHO MEAS - SI(MOD-SP4): 13.3 ML/M^2
BH CV ECHO MEAS - SI(TEICH): 25.3 ML/M^2
BH CV ECHO MEAS - SV(AO): 171.5 ML
BH CV ECHO MEAS - SV(CUBED): 92.3 ML
BH CV ECHO MEAS - SV(LVOT): 40.5 ML
BH CV ECHO MEAS - SV(MOD-SP2): 36 ML
BH CV ECHO MEAS - SV(MOD-SP4): 33 ML
BH CV ECHO MEAS - SV(TEICH): 62.5 ML
BH CV ECHO MEAS - TR MAX PG: 51 MMHG
BH CV ECHO MEAS - TR MAX VEL: 312.3 CM/SEC
BH CV ECHO MEASUREMENTS AVERAGE E/E' RATIO: 15.07
BH CV VAS BP LEFT ARM: NORMAL MMHG
BH CV XLRA - RV BASE: 4.3 CM
BH CV XLRA - RV LENGTH: 7 CM
BH CV XLRA - RV MID: 3.4 CM
BILIRUB UR QL STRIP: NEGATIVE
BUN SERPL-MCNC: 22 MG/DL (ref 8–23)
BUN/CREAT SERPL: 14.5 (ref 7–25)
CALCIUM SPEC-SCNC: 9.4 MG/DL (ref 8.6–10.5)
CHLORIDE SERPL-SCNC: 102 MMOL/L (ref 98–107)
CLARITY UR: CLEAR
CO2 SERPL-SCNC: 26 MMOL/L (ref 22–29)
COLOR UR: YELLOW
CREAT SERPL-MCNC: 1.52 MG/DL (ref 0.76–1.27)
DEPRECATED RDW RBC AUTO: 49.5 FL (ref 37–54)
EOSINOPHIL # BLD AUTO: 0.44 10*3/MM3 (ref 0–0.4)
EOSINOPHIL NFR BLD AUTO: 5.3 % (ref 0.3–6.2)
ERYTHROCYTE [DISTWIDTH] IN BLOOD BY AUTOMATED COUNT: 14.9 % (ref 12.3–15.4)
GFR SERPL CREATININE-BSD FRML MDRD: 45 ML/MIN/1.73
GLUCOSE BLDC GLUCOMTR-MCNC: 134 MG/DL (ref 70–130)
GLUCOSE BLDC GLUCOMTR-MCNC: 162 MG/DL (ref 70–130)
GLUCOSE BLDC GLUCOMTR-MCNC: 205 MG/DL (ref 70–130)
GLUCOSE BLDC GLUCOMTR-MCNC: 78 MG/DL (ref 70–130)
GLUCOSE SERPL-MCNC: 104 MG/DL (ref 65–99)
GLUCOSE UR STRIP-MCNC: NEGATIVE MG/DL
HBA1C MFR BLD: 6.5 % (ref 4.8–5.6)
HCT VFR BLD AUTO: 39.3 % (ref 37.5–51)
HGB BLD-MCNC: 12.7 G/DL (ref 13–17.7)
HGB UR QL STRIP.AUTO: NEGATIVE
HYALINE CASTS UR QL AUTO: NORMAL /LPF
IMM GRANULOCYTES # BLD AUTO: 0.04 10*3/MM3 (ref 0–0.05)
IMM GRANULOCYTES NFR BLD AUTO: 0.5 % (ref 0–0.5)
INR PPP: 3.02 (ref 0.85–1.16)
KETONES UR QL STRIP: NEGATIVE
LEFT ATRIUM VOLUME INDEX: 47 ML/M2
LEUKOCYTE ESTERASE UR QL STRIP.AUTO: ABNORMAL
LV EF 2D ECHO EST: 30 %
LYMPHOCYTES # BLD AUTO: 1.54 10*3/MM3 (ref 0.7–3.1)
LYMPHOCYTES NFR BLD AUTO: 18.6 % (ref 19.6–45.3)
MAGNESIUM SERPL-MCNC: 1.9 MG/DL (ref 1.6–2.4)
MAXIMAL PREDICTED HEART RATE: 145 BPM
MCH RBC QN AUTO: 29.3 PG (ref 26.6–33)
MCHC RBC AUTO-ENTMCNC: 32.3 G/DL (ref 31.5–35.7)
MCV RBC AUTO: 90.8 FL (ref 79–97)
MONOCYTES # BLD AUTO: 0.89 10*3/MM3 (ref 0.1–0.9)
MONOCYTES NFR BLD AUTO: 10.8 % (ref 5–12)
NEUTROPHILS NFR BLD AUTO: 5.3 10*3/MM3 (ref 1.7–7)
NEUTROPHILS NFR BLD AUTO: 64.2 % (ref 42.7–76)
NITRITE UR QL STRIP: NEGATIVE
NRBC BLD AUTO-RTO: 0 /100 WBC (ref 0–0.2)
PH UR STRIP.AUTO: 5.5 [PH] (ref 5–8)
PLATELET # BLD AUTO: 211 10*3/MM3 (ref 140–450)
PMV BLD AUTO: 11.3 FL (ref 6–12)
POTASSIUM SERPL-SCNC: 4.4 MMOL/L (ref 3.5–5.2)
PROT UR QL STRIP: NEGATIVE
PROTHROMBIN TIME: 30 SECONDS (ref 11.4–14.4)
RBC # BLD AUTO: 4.33 10*6/MM3 (ref 4.14–5.8)
RBC # UR: NORMAL /HPF
REF LAB TEST METHOD: NORMAL
SODIUM SERPL-SCNC: 137 MMOL/L (ref 136–145)
SP GR UR STRIP: 1.01 (ref 1–1.03)
SQUAMOUS #/AREA URNS HPF: NORMAL /HPF
STRESS TARGET HR: 123 BPM
TSH SERPL DL<=0.05 MIU/L-ACNC: 4.2 UIU/ML (ref 0.27–4.2)
UROBILINOGEN UR QL STRIP: ABNORMAL
WBC # BLD AUTO: 8.26 10*3/MM3 (ref 3.4–10.8)
WBC UR QL AUTO: NORMAL /HPF

## 2021-06-29 PROCEDURE — 84443 ASSAY THYROID STIM HORMONE: CPT | Performed by: NURSE PRACTITIONER

## 2021-06-29 PROCEDURE — 97535 SELF CARE MNGMENT TRAINING: CPT

## 2021-06-29 PROCEDURE — G0378 HOSPITAL OBSERVATION PER HR: HCPCS

## 2021-06-29 PROCEDURE — 85025 COMPLETE CBC W/AUTO DIFF WBC: CPT | Performed by: NURSE PRACTITIONER

## 2021-06-29 PROCEDURE — 93306 TTE W/DOPPLER COMPLETE: CPT | Performed by: INTERNAL MEDICINE

## 2021-06-29 PROCEDURE — 82962 GLUCOSE BLOOD TEST: CPT

## 2021-06-29 PROCEDURE — 97165 OT EVAL LOW COMPLEX 30 MIN: CPT

## 2021-06-29 PROCEDURE — 99204 OFFICE O/P NEW MOD 45 MIN: CPT | Performed by: INTERNAL MEDICINE

## 2021-06-29 PROCEDURE — 83735 ASSAY OF MAGNESIUM: CPT | Performed by: NURSE PRACTITIONER

## 2021-06-29 PROCEDURE — 83036 HEMOGLOBIN GLYCOSYLATED A1C: CPT | Performed by: NURSE PRACTITIONER

## 2021-06-29 PROCEDURE — 81001 URINALYSIS AUTO W/SCOPE: CPT | Performed by: NURSE PRACTITIONER

## 2021-06-29 PROCEDURE — 63710000001 INSULIN DETEMIR PER 5 UNITS: Performed by: INTERNAL MEDICINE

## 2021-06-29 PROCEDURE — 93306 TTE W/DOPPLER COMPLETE: CPT

## 2021-06-29 PROCEDURE — 99226 PR SBSQ OBSERVATION CARE/DAY 35 MINUTES: CPT | Performed by: INTERNAL MEDICINE

## 2021-06-29 PROCEDURE — 63710000001 INSULIN LISPRO (HUMAN) PER 5 UNITS: Performed by: NURSE PRACTITIONER

## 2021-06-29 PROCEDURE — 85610 PROTHROMBIN TIME: CPT | Performed by: NURSE PRACTITIONER

## 2021-06-29 PROCEDURE — 80048 BASIC METABOLIC PNL TOTAL CA: CPT | Performed by: NURSE PRACTITIONER

## 2021-06-29 RX ORDER — LOSARTAN POTASSIUM 25 MG/1
25 TABLET ORAL DAILY
COMMUNITY
Start: 2021-03-26 | End: 2021-06-30 | Stop reason: HOSPADM

## 2021-06-29 RX ORDER — WARFARIN SODIUM 2.5 MG/1
2.5 TABLET ORAL
Status: COMPLETED | OUTPATIENT
Start: 2021-06-29 | End: 2021-06-29

## 2021-06-29 RX ORDER — TORSEMIDE 20 MG/1
20 TABLET ORAL DAILY
Status: DISCONTINUED | OUTPATIENT
Start: 2021-06-29 | End: 2021-06-30 | Stop reason: HOSPADM

## 2021-06-29 RX ORDER — WARFARIN SODIUM 5 MG/1
5 TABLET ORAL
Status: DISCONTINUED | OUTPATIENT
Start: 2021-06-30 | End: 2021-06-30

## 2021-06-29 RX ORDER — MONTELUKAST SODIUM 10 MG/1
10 TABLET ORAL NIGHTLY
Status: ON HOLD | COMMUNITY
End: 2021-07-24

## 2021-06-29 RX ADMIN — TORSEMIDE 20 MG: 20 TABLET ORAL at 13:23

## 2021-06-29 RX ADMIN — SODIUM CHLORIDE, PRESERVATIVE FREE 10 ML: 5 INJECTION INTRAVENOUS at 09:21

## 2021-06-29 RX ADMIN — LEVOTHYROXINE SODIUM 125 MCG: 125 TABLET ORAL at 05:40

## 2021-06-29 RX ADMIN — WARFARIN SODIUM 2.5 MG: 2.5 TABLET ORAL at 18:12

## 2021-06-29 RX ADMIN — CARVEDILOL 12.5 MG: 12.5 TABLET, FILM COATED ORAL at 09:20

## 2021-06-29 RX ADMIN — ASPIRIN 325 MG ORAL TABLET 325 MG: 325 PILL ORAL at 09:20

## 2021-06-29 RX ADMIN — CETIRIZINE HYDROCHLORIDE 10 MG: 10 TABLET, FILM COATED ORAL at 09:20

## 2021-06-29 RX ADMIN — SPIRONOLACTONE 25 MG: 25 TABLET ORAL at 09:21

## 2021-06-29 RX ADMIN — SODIUM CHLORIDE, PRESERVATIVE FREE 10 ML: 5 INJECTION INTRAVENOUS at 21:35

## 2021-06-29 RX ADMIN — INSULIN LISPRO 5 UNITS: 100 INJECTION, SOLUTION INTRAVENOUS; SUBCUTANEOUS at 21:34

## 2021-06-29 RX ADMIN — FENOFIBRATE 145 MG: 145 TABLET ORAL at 09:21

## 2021-06-29 RX ADMIN — HYDROCODONE BITARTRATE AND ACETAMINOPHEN 1 TABLET: 5; 325 TABLET ORAL at 13:36

## 2021-06-29 RX ADMIN — ATORVASTATIN CALCIUM 20 MG: 20 TABLET, FILM COATED ORAL at 09:21

## 2021-06-29 RX ADMIN — GABAPENTIN 900 MG: 300 CAPSULE ORAL at 21:35

## 2021-06-29 RX ADMIN — INSULIN LISPRO 3 UNITS: 100 INJECTION, SOLUTION INTRAVENOUS; SUBCUTANEOUS at 17:30

## 2021-06-29 RX ADMIN — INSULIN DETEMIR 40 UNITS: 100 INJECTION, SOLUTION SUBCUTANEOUS at 11:51

## 2021-06-30 VITALS
RESPIRATION RATE: 16 BRPM | HEART RATE: 81 BPM | BODY MASS INDEX: 34.78 KG/M2 | SYSTOLIC BLOOD PRESSURE: 102 MMHG | DIASTOLIC BLOOD PRESSURE: 72 MMHG | WEIGHT: 271 LBS | HEIGHT: 74 IN | TEMPERATURE: 97.7 F | OXYGEN SATURATION: 93 %

## 2021-06-30 LAB
ANION GAP SERPL CALCULATED.3IONS-SCNC: 10 MMOL/L (ref 5–15)
BUN SERPL-MCNC: 24 MG/DL (ref 8–23)
BUN/CREAT SERPL: 17.1 (ref 7–25)
CALCIUM SPEC-SCNC: 9.3 MG/DL (ref 8.6–10.5)
CHLORIDE SERPL-SCNC: 104 MMOL/L (ref 98–107)
CO2 SERPL-SCNC: 26 MMOL/L (ref 22–29)
CREAT SERPL-MCNC: 1.4 MG/DL (ref 0.76–1.27)
GFR SERPL CREATININE-BSD FRML MDRD: 49 ML/MIN/1.73
GLUCOSE BLDC GLUCOMTR-MCNC: 131 MG/DL (ref 70–130)
GLUCOSE BLDC GLUCOMTR-MCNC: 61 MG/DL (ref 70–130)
GLUCOSE SERPL-MCNC: 64 MG/DL (ref 65–99)
INR PPP: 3.56 (ref 0.85–1.16)
MAGNESIUM SERPL-MCNC: 1.9 MG/DL (ref 1.6–2.4)
POTASSIUM SERPL-SCNC: 4.2 MMOL/L (ref 3.5–5.2)
PROTHROMBIN TIME: 34 SECONDS (ref 11.4–14.4)
SODIUM SERPL-SCNC: 140 MMOL/L (ref 136–145)

## 2021-06-30 PROCEDURE — 63710000001 INSULIN DETEMIR PER 5 UNITS: Performed by: INTERNAL MEDICINE

## 2021-06-30 PROCEDURE — 99217 PR OBSERVATION CARE DISCHARGE MANAGEMENT: CPT | Performed by: INTERNAL MEDICINE

## 2021-06-30 PROCEDURE — G0378 HOSPITAL OBSERVATION PER HR: HCPCS

## 2021-06-30 PROCEDURE — 99214 OFFICE O/P EST MOD 30 MIN: CPT | Performed by: INTERNAL MEDICINE

## 2021-06-30 PROCEDURE — 82962 GLUCOSE BLOOD TEST: CPT

## 2021-06-30 PROCEDURE — 85610 PROTHROMBIN TIME: CPT | Performed by: NURSE PRACTITIONER

## 2021-06-30 PROCEDURE — 97110 THERAPEUTIC EXERCISES: CPT

## 2021-06-30 PROCEDURE — 97161 PT EVAL LOW COMPLEX 20 MIN: CPT

## 2021-06-30 PROCEDURE — 80048 BASIC METABOLIC PNL TOTAL CA: CPT | Performed by: NURSE PRACTITIONER

## 2021-06-30 PROCEDURE — 97116 GAIT TRAINING THERAPY: CPT

## 2021-06-30 PROCEDURE — 83735 ASSAY OF MAGNESIUM: CPT | Performed by: NURSE PRACTITIONER

## 2021-06-30 RX ORDER — ASPIRIN 81 MG/1
81 TABLET ORAL DAILY
Qty: 30 TABLET | Refills: 0 | Status: SHIPPED | OUTPATIENT
Start: 2021-06-30

## 2021-06-30 RX ORDER — ICOSAPENT ETHYL 1000 MG/1
2 CAPSULE ORAL 2 TIMES DAILY WITH MEALS
Qty: 120 CAPSULE | Refills: 0 | Status: SHIPPED | OUTPATIENT
Start: 2021-06-30

## 2021-06-30 RX ORDER — WARFARIN SODIUM 5 MG/1
5 TABLET ORAL
Status: DISCONTINUED | OUTPATIENT
Start: 2021-07-01 | End: 2021-06-30 | Stop reason: HOSPADM

## 2021-06-30 RX ORDER — INSULIN GLARGINE 100 [IU]/ML
40 INJECTION, SOLUTION SUBCUTANEOUS NIGHTLY
Refills: 12
Start: 2021-06-30

## 2021-06-30 RX ORDER — CARVEDILOL 12.5 MG/1
12.5 TABLET ORAL 2 TIMES DAILY WITH MEALS
Qty: 60 TABLET | Refills: 0 | Status: SHIPPED | OUTPATIENT
Start: 2021-06-30

## 2021-06-30 RX ORDER — TORSEMIDE 20 MG/1
TABLET ORAL
Qty: 30 TABLET | Refills: 0 | Status: SHIPPED | OUTPATIENT
Start: 2021-06-30 | End: 2021-07-07 | Stop reason: SDUPTHER

## 2021-06-30 RX ORDER — ATORVASTATIN CALCIUM 20 MG/1
20 TABLET, FILM COATED ORAL DAILY
Qty: 30 TABLET | Refills: 0 | Status: SHIPPED | OUTPATIENT
Start: 2021-07-01

## 2021-06-30 RX ADMIN — FENOFIBRATE 145 MG: 145 TABLET ORAL at 08:34

## 2021-06-30 RX ADMIN — TORSEMIDE 20 MG: 20 TABLET ORAL at 08:34

## 2021-06-30 RX ADMIN — CARVEDILOL 12.5 MG: 12.5 TABLET, FILM COATED ORAL at 08:34

## 2021-06-30 RX ADMIN — INSULIN DETEMIR 40 UNITS: 100 INJECTION, SOLUTION SUBCUTANEOUS at 08:34

## 2021-06-30 RX ADMIN — SPIRONOLACTONE 25 MG: 25 TABLET ORAL at 08:34

## 2021-06-30 RX ADMIN — ATORVASTATIN CALCIUM 20 MG: 20 TABLET, FILM COATED ORAL at 08:34

## 2021-06-30 RX ADMIN — SODIUM CHLORIDE, PRESERVATIVE FREE 10 ML: 5 INJECTION INTRAVENOUS at 08:34

## 2021-06-30 RX ADMIN — CETIRIZINE HYDROCHLORIDE 10 MG: 10 TABLET, FILM COATED ORAL at 08:34

## 2021-06-30 RX ADMIN — LEVOTHYROXINE SODIUM 125 MCG: 125 TABLET ORAL at 05:29

## 2021-06-30 RX ADMIN — ASPIRIN 325 MG ORAL TABLET 325 MG: 325 PILL ORAL at 08:34

## 2021-07-01 ENCOUNTER — READMISSION MANAGEMENT (OUTPATIENT)
Dept: CALL CENTER | Facility: HOSPITAL | Age: 76
End: 2021-07-01

## 2021-07-01 ENCOUNTER — NURSE TRIAGE (OUTPATIENT)
Dept: CALL CENTER | Facility: HOSPITAL | Age: 76
End: 2021-07-01

## 2021-07-01 NOTE — OUTREACH NOTE
Prep Survey      Responses   [facility] facility patient discharged from?  [place]   Is LACE score < 7 ?  Yes   Emergency Room discharge w/ pulse ox?  No   Eligibility  Readm Mgmt   Discharge diagnosis  **Acute on chronic congestive heart failure   Does the patient have one of the following disease processes/diagnoses(primary or secondary)?  CHF   Does the patient have Home health ordered?  No   Is there a DME ordered?  No   Prep survey completed?  Yes          Peg Amado RN

## 2021-07-01 NOTE — TELEPHONE ENCOUNTER
"    Reason for Disposition  • General information question, no triage required and triager able to answer question    Additional Information  • Negative: [1] Caller is not with the adult (patient) AND [2] reporting urgent symptoms  • Negative: Lab result questions  • Negative: Medication questions  • Negative: Caller can't be reached by phone  • Negative: Caller has already spoken to PCP or another triager  • Negative: RN needs further essential information from caller in order to complete triage  • Negative: Requesting regular office appointment  • Negative: [1] Caller requesting NON-URGENT health information AND [2] PCP's office is the best resource  • Negative: Health Information question, no triage required and triager able to answer question    Answer Assessment - Initial Assessment Questions  1. REASON FOR CALL or QUESTION: \"What is your reason for calling today?\" or \"How can I best help you?\" or \"What question do you have that I can help answer?\"  Wife is caller asking about AVS medication list. Asking specifically about Metformin. Wanting to verify that it has been stopped. Question answered and advised to speak with PCP if medication should be resumed.    Protocols used: INFORMATION ONLY CALL - NO TRIAGE-ADULT-      "

## 2021-07-02 ENCOUNTER — READMISSION MANAGEMENT (OUTPATIENT)
Dept: CALL CENTER | Facility: HOSPITAL | Age: 76
End: 2021-07-02

## 2021-07-02 NOTE — OUTREACH NOTE
CHF Week 1 Survey      Responses   [facility] patient discharged from?  [place]   Does the patient have one of the following disease processes/diagnoses(primary or secondary)?  CHF   CHF Week 1 attempt successful?  No   Unsuccessful attempts  Attempt 1          Brandy Huerta RN

## 2021-07-05 ENCOUNTER — READMISSION MANAGEMENT (OUTPATIENT)
Dept: CALL CENTER | Facility: HOSPITAL | Age: 76
End: 2021-07-05

## 2021-07-05 NOTE — OUTREACH NOTE
CHF Week 1 Survey      Responses   [facility] patient discharged from?  [place]   Does the patient have one of the following disease processes/diagnoses(primary or secondary)?  CHF   CHF Week 1 attempt successful?  Yes   Call start time  1109   Call end time  1134   Discharge diagnosis  **Acute on chronic congestive heart failure   Meds reviewed with patient/caregiver?  Yes   Is the patient having any side effects they believe may be caused by any medication additions or changes?  No   Does the patient have all medications ordered at discharge?  Yes   Is the patient taking all medications as directed (includes completed medication regime)?  Yes   Does the patient have a primary care provider?   Yes   Does the patient have an appointment with their PCP within 7 days of discharge?  Yes   Has the patient kept scheduled appointments due by today?  N/A   Has home health visited the patient within 72 hours of discharge?  N/A   Psychosocial issues?  No   Did the patient receive a copy of their discharge instructions?  Yes   Nursing interventions  Reviewed instructions with patient   What is the patient's perception of their health status since discharge?  Improving   Nursing interventions  Nurse provided patient education   Is the patient weighing daily?  Yes   Does the patient have scales?  Yes   Is the patient able to teach back Heart Failure diet management?  Yes   Is the patient able to teach back Heart Failure Zones?  Yes   Is the patient able to teach back signs and symptoms of worsening condition? (i.e. weight gain, shortness of air, etc.)  Yes   If the patient is a current smoker, are they able to teach back resources for cessation?  Not a smoker   Is the patient/caregiver able to teach back the hierarchy of who to call/visit for symptoms/problems? PCP, Specialist, Home health nurse, Urgent Care, ED, 911  Yes   Additional teach back comments  states watches sodium, had questions about use in cooking, states  has fatigue, asking when energy will return    CHF Week 1 call completed?  Yes          Aysha Zarco RN

## 2021-07-06 ENCOUNTER — OFFICE VISIT (OUTPATIENT)
Dept: CARDIOLOGY | Facility: HOSPITAL | Age: 76
End: 2021-07-06

## 2021-07-06 VITALS
BODY MASS INDEX: 34.72 KG/M2 | OXYGEN SATURATION: 100 % | HEART RATE: 79 BPM | RESPIRATION RATE: 20 BRPM | TEMPERATURE: 96.6 F | SYSTOLIC BLOOD PRESSURE: 117 MMHG | WEIGHT: 270.5 LBS | DIASTOLIC BLOOD PRESSURE: 72 MMHG | HEIGHT: 74 IN

## 2021-07-06 DIAGNOSIS — I25.10 CORONARY ARTERY DISEASE INVOLVING NATIVE CORONARY ARTERY OF NATIVE HEART WITHOUT ANGINA PECTORIS: ICD-10-CM

## 2021-07-06 DIAGNOSIS — I50.42 CHRONIC COMBINED SYSTOLIC AND DIASTOLIC HEART FAILURE (HCC): Primary | ICD-10-CM

## 2021-07-06 DIAGNOSIS — I10 ESSENTIAL HYPERTENSION: ICD-10-CM

## 2021-07-06 DIAGNOSIS — N18.31 STAGE 3A CHRONIC KIDNEY DISEASE (HCC): ICD-10-CM

## 2021-07-06 DIAGNOSIS — I48.0 PAF (PAROXYSMAL ATRIAL FIBRILLATION) (HCC): ICD-10-CM

## 2021-07-06 PROCEDURE — 99214 OFFICE O/P EST MOD 30 MIN: CPT | Performed by: NURSE PRACTITIONER

## 2021-07-06 NOTE — PROGRESS NOTES
"[facility] V[facility]    Chief Complaint  Congestive Heart Failure    Subjective    History of Present Illness {CC  Problem List  Visit  Diagnosis   Encounters  Notes  Medications  Labs  Result Review Imaging  Media :23}     Benjamín Johnson Jr presents to [facility] CARDIOLOGY for   History of Present Illness   75-year-old male presented to [facility] on 6/28/2021 with hypertension, hyperlipidemia, CAD status post stent in 2000, PAF on Coumadin, ICD, heart failure with reduced EF, hypothyroidism, diabetes type 2, chronic kidney disease stage III    Patient was admitted to [facility] on 6/28/2021 with acute on chronic heart failure.  Echo 6/29/2021: EF 30%, moderate TR, moderate pulmonary hypertension, grade 2 diastolic dysfunction.  Patient with right pleural effusion.  Patient received IV diuretics.  Discharged home on carvedilol, spironolactone and torsemide.      Patient has been followed by Amrik Ramirez with [facility]    D/c creatinine 1.4.  2014 creatine 1.4.  Max creatinine 1.6 during stay.    Improved dyspnea.  No CP, pressure, dizziness, syncope edema.  Increased belching prior to hospitalization.  Worse after eating.      Pt taking torsemide 10 mg .  Has not taken extra dosing.     Objective     Vital Signs:   Vitals:    07/06/21 1249 07/06/21 1250 07/06/21 1251   BP: 129/61 110/65 117/72   BP Location: Right arm Left arm Left arm   Patient Position: Sitting Sitting Sitting   Cuff Size: Large Adult Adult Large Adult   Pulse: 82 109 79   Resp:   20   Temp:  96.6 °F (35.9 °C) 96.6 °F (35.9 °C)   TempSrc:  Temporal Temporal   SpO2: 100% 98% 100%   Weight:   123 kg (270 lb 8 oz)   Height:   188 cm (74\")     Body mass index is 34.73 kg/m².  Physical Exam  Vitals reviewed.   Constitutional:       General: He is not in acute distress.     Appearance: Normal appearance.   Cardiovascular:      Rate and Rhythm: Normal rate " and regular rhythm.      Pulses:           Radial pulses are 2+ on the right side.        Dorsalis pedis pulses are 2+ on the right side.        Posterior tibial pulses are 2+ on the right side.      Heart sounds: Normal heart sounds.   Pulmonary:      Effort: Pulmonary effort is normal.      Breath sounds: Normal breath sounds.   Abdominal:      Palpations: Abdomen is soft.      Tenderness: There is no abdominal tenderness.   Musculoskeletal:      Right lower leg: No edema.      Left lower leg: No edema.   Skin:     General: Skin is warm and dry.   Neurological:      Mental Status: He is alert.   Psychiatric:         Mood and Affect: Mood normal.         Behavior: Behavior is cooperative.              Result Review  Data Reviewed:{ Labs  Result Review  Imaging  Med Tab  Media :23}     Lab Results   Component Value Date    WBC 8.26 06/29/2021    HGB 12.7 (L) 06/29/2021    HCT 39.3 06/29/2021    MCV 90.8 06/29/2021     06/29/2021     Lab Results   Component Value Date    GLUCOSE 64 (L) 06/30/2021    CALCIUM 9.3 06/30/2021     06/30/2021    K 4.2 06/30/2021    CO2 26.0 06/30/2021     06/30/2021    BUN 24 (H) 06/30/2021    CREATININE 1.40 (H) 06/30/2021    EGFRIFNONA 49 (L) 06/30/2021    BCR 17.1 06/30/2021    ANIONGAP 10.0 06/30/2021     Lab Results   Component Value Date    TSH 4.200 06/29/2021     No results found for: CHOL, CHLPL  No results found for: TRIG  No results found for: HDL  No results found for: LDL, LDLDIRECT    Assessment and Plan {CC Problem List  Visit Diagnosis  ROS  Review (Popup)  Health Maintenance  Quality  BestPractice  Medications  SmartSets  SnapShot Encounters  Media :23}   1. Chronic combined systolic and diastolic heart failure (CMS/HCC)  EF 30% with BIV  Improved dyspnea    Coreg, aldactone, torsemide  No ace/arb/arni at this time due to CKD  -BMP  - proBNP; Future    Heart failure education discussed: What is heart failure, causes, signs and symptoms,  medication management, daily weight monitoring, low-sodium diet of less than 2 g per day, daily exercise, role the heart failure center.    2. Coronary artery disease involving native coronary artery of native heart without angina pectoris  No CP, pressure  Hx of stent 2000  No CP, pressure    Vascepa, Lipitor, asa    3. Essential hypertension  Controlled      4. Stage 3a chronic kidney disease (CMS/HCC)    - Basic Metabolic Panel; Future    5. PAF (paroxysmal atrial fibrillation) (CMS/[facility])  coumadin PCP to follow  HR controlled        Pt plans to establish with [facility] at [facility] site (4 weeks)    Follow Up {Instructions Charge Capture  Follow-up Communications :23}   Return in about 3 months (around 10/6/2021) for HF, Office visit.    Patient was given instructions and counseling regarding his condition or for health maintenance advice. Please see specific information pulled into the AVS if appropriate.  Patient was instructed to call the [facility] with any questions, concerns, or worsening symptoms.    *Please note that portions of this note were completed with a voice recognition program. Efforts were made to edit the dictations, but occasionally words are mistranscribed.

## 2021-07-07 ENCOUNTER — TELEPHONE (OUTPATIENT)
Dept: CARDIOLOGY | Facility: HOSPITAL | Age: 76
End: 2021-07-07

## 2021-07-07 DIAGNOSIS — I50.42 CHRONIC COMBINED SYSTOLIC AND DIASTOLIC HEART FAILURE (HCC): Primary | ICD-10-CM

## 2021-07-07 RX ORDER — TORSEMIDE 20 MG/1
20 TABLET ORAL DAILY
Qty: 30 TABLET | Refills: 3 | Status: SHIPPED | OUTPATIENT
Start: 2021-07-07

## 2021-07-07 NOTE — TELEPHONE ENCOUNTER
Pt kidney function is stable.     I would like him to increase his torsemide from 10 mg daily, to 20 mg daily.  Continue spironolactone 25 mg daily.  No other change to his medications.    Repeat labs to check his kidney function in 2 weeks. Lab order in [facility] to be mailed to patient if needed.

## 2021-07-07 NOTE — TELEPHONE ENCOUNTER
Patient notified of normal kidney function and will start Torsemide 20mg daily and continue Spironolactone 25mg daily. Patient requested that a lab order be mailed and was advised to have this drawn in two weeks. Patient verbalized understanding.

## 2021-07-14 ENCOUNTER — READMISSION MANAGEMENT (OUTPATIENT)
Dept: CALL CENTER | Facility: HOSPITAL | Age: 76
End: 2021-07-14

## 2021-07-14 NOTE — OUTREACH NOTE
CHF Week 2 Survey      Responses   [facility] patient discharged from?  [place]   Does the patient have one of the following disease processes/diagnoses(primary or secondary)?  CHF   Week 2 attempt successful?  No   Unsuccessful attempts  Attempt 1          Birdie Caldwell LPN

## 2021-07-24 ENCOUNTER — HOSPITAL ENCOUNTER (OUTPATIENT)
Facility: HOSPITAL | Age: 76
Setting detail: OBSERVATION
LOS: 1 days | Discharge: HOME | End: 2021-07-27
Attending: STUDENT IN AN ORGANIZED HEALTH CARE EDUCATION/TRAINING PROGRAM | Admitting: INTERNAL MEDICINE

## 2021-07-24 ENCOUNTER — APPOINTMENT (OUTPATIENT)
Dept: GENERAL RADIOLOGY | Facility: HOSPITAL | Age: 76
End: 2021-07-24

## 2021-07-24 DIAGNOSIS — I50.23 ACUTE ON CHRONIC SYSTOLIC CONGESTIVE HEART FAILURE (HCC): Primary | ICD-10-CM

## 2021-07-24 LAB
ALBUMIN SERPL-MCNC: 4 G/DL (ref 3.5–5.2)
ALBUMIN/GLOB SERPL: 1.2 G/DL
ALP SERPL-CCNC: 78 U/L (ref 39–117)
ALT SERPL W P-5'-P-CCNC: 24 U/L (ref 1–41)
ANION GAP SERPL CALCULATED.3IONS-SCNC: 11 MMOL/L (ref 5–15)
AST SERPL-CCNC: 28 U/L (ref 1–40)
BASOPHILS # BLD AUTO: 0.1 10*3/MM3 (ref 0–0.2)
BASOPHILS NFR BLD AUTO: 1 % (ref 0–1.5)
BILIRUB SERPL-MCNC: 1.4 MG/DL (ref 0–1.2)
BUN SERPL-MCNC: 21 MG/DL (ref 8–23)
BUN/CREAT SERPL: 14.3 (ref 7–25)
CALCIUM SPEC-SCNC: 10 MG/DL (ref 8.6–10.5)
CHLORIDE SERPL-SCNC: 102 MMOL/L (ref 98–107)
CO2 SERPL-SCNC: 28 MMOL/L (ref 22–29)
CREAT SERPL-MCNC: 1.47 MG/DL (ref 0.76–1.27)
DEPRECATED RDW RBC AUTO: 50.3 FL (ref 37–54)
EOSINOPHIL # BLD AUTO: 0.25 10*3/MM3 (ref 0–0.4)
EOSINOPHIL NFR BLD AUTO: 2.6 % (ref 0.3–6.2)
ERYTHROCYTE [DISTWIDTH] IN BLOOD BY AUTOMATED COUNT: 14.7 % (ref 12.3–15.4)
GFR SERPL CREATININE-BSD FRML MDRD: 47 ML/MIN/1.73
GLOBULIN UR ELPH-MCNC: 3.4 GM/DL
GLUCOSE BLDC GLUCOMTR-MCNC: 100 MG/DL (ref 70–130)
GLUCOSE BLDC GLUCOMTR-MCNC: 110 MG/DL (ref 70–130)
GLUCOSE BLDC GLUCOMTR-MCNC: 173 MG/DL (ref 70–130)
GLUCOSE BLDC GLUCOMTR-MCNC: 202 MG/DL (ref 70–130)
GLUCOSE SERPL-MCNC: 111 MG/DL (ref 65–99)
HCT VFR BLD AUTO: 42.9 % (ref 37.5–51)
HGB BLD-MCNC: 13.2 G/DL (ref 13–17.7)
HOLD SPECIMEN: NORMAL
IMM GRANULOCYTES # BLD AUTO: 0.04 10*3/MM3 (ref 0–0.05)
IMM GRANULOCYTES NFR BLD AUTO: 0.4 % (ref 0–0.5)
INR PPP: 1.76 (ref 0.85–1.16)
LYMPHOCYTES # BLD AUTO: 1.46 10*3/MM3 (ref 0.7–3.1)
LYMPHOCYTES NFR BLD AUTO: 14.9 % (ref 19.6–45.3)
MCH RBC QN AUTO: 28.7 PG (ref 26.6–33)
MCHC RBC AUTO-ENTMCNC: 30.8 G/DL (ref 31.5–35.7)
MCV RBC AUTO: 93.3 FL (ref 79–97)
MONOCYTES # BLD AUTO: 0.95 10*3/MM3 (ref 0.1–0.9)
MONOCYTES NFR BLD AUTO: 9.7 % (ref 5–12)
NEUTROPHILS NFR BLD AUTO: 6.98 10*3/MM3 (ref 1.7–7)
NEUTROPHILS NFR BLD AUTO: 71.4 % (ref 42.7–76)
NRBC BLD AUTO-RTO: 0 /100 WBC (ref 0–0.2)
NT-PROBNP SERPL-MCNC: 3579 PG/ML (ref 0–1800)
PLATELET # BLD AUTO: 207 10*3/MM3 (ref 140–450)
PMV BLD AUTO: 10.8 FL (ref 6–12)
POTASSIUM SERPL-SCNC: 4.9 MMOL/L (ref 3.5–5.2)
PROT SERPL-MCNC: 7.4 G/DL (ref 6–8.5)
PROTHROMBIN TIME: 19.8 SECONDS (ref 11.4–14.4)
RBC # BLD AUTO: 4.6 10*6/MM3 (ref 4.14–5.8)
SODIUM SERPL-SCNC: 141 MMOL/L (ref 136–145)
TROPONIN T SERPL-MCNC: <0.01 NG/ML (ref 0–0.03)
WBC # BLD AUTO: 9.78 10*3/MM3 (ref 3.4–10.8)
WHOLE BLOOD HOLD SPECIMEN: NORMAL

## 2021-07-24 PROCEDURE — 63710000001 INSULIN LISPRO (HUMAN) PER 5 UNITS: Performed by: INTERNAL MEDICINE

## 2021-07-24 PROCEDURE — 82962 GLUCOSE BLOOD TEST: CPT

## 2021-07-24 PROCEDURE — 71045 X-RAY EXAM CHEST 1 VIEW: CPT

## 2021-07-24 PROCEDURE — 96375 TX/PRO/DX INJ NEW DRUG ADDON: CPT

## 2021-07-24 PROCEDURE — 93010 ELECTROCARDIOGRAM REPORT: CPT | Performed by: INTERNAL MEDICINE

## 2021-07-24 PROCEDURE — 63710000001 INSULIN DETEMIR PER 5 UNITS: Performed by: INTERNAL MEDICINE

## 2021-07-24 PROCEDURE — 93005 ELECTROCARDIOGRAM TRACING: CPT

## 2021-07-24 PROCEDURE — 80053 COMPREHEN METABOLIC PANEL: CPT | Performed by: STUDENT IN AN ORGANIZED HEALTH CARE EDUCATION/TRAINING PROGRAM

## 2021-07-24 PROCEDURE — 99285 EMERGENCY DEPT VISIT HI MDM: CPT

## 2021-07-24 PROCEDURE — 99219 PR INITIAL OBSERVATION CARE/DAY 50 MINUTES: CPT | Performed by: INTERNAL MEDICINE

## 2021-07-24 PROCEDURE — 85025 COMPLETE CBC W/AUTO DIFF WBC: CPT | Performed by: STUDENT IN AN ORGANIZED HEALTH CARE EDUCATION/TRAINING PROGRAM

## 2021-07-24 PROCEDURE — 25010000002 FUROSEMIDE PER 20 MG: Performed by: STUDENT IN AN ORGANIZED HEALTH CARE EDUCATION/TRAINING PROGRAM

## 2021-07-24 PROCEDURE — 96374 THER/PROPH/DIAG INJ IV PUSH: CPT

## 2021-07-24 PROCEDURE — 84484 ASSAY OF TROPONIN QUANT: CPT | Performed by: STUDENT IN AN ORGANIZED HEALTH CARE EDUCATION/TRAINING PROGRAM

## 2021-07-24 PROCEDURE — 83880 ASSAY OF NATRIURETIC PEPTIDE: CPT | Performed by: STUDENT IN AN ORGANIZED HEALTH CARE EDUCATION/TRAINING PROGRAM

## 2021-07-24 PROCEDURE — 93005 ELECTROCARDIOGRAM TRACING: CPT | Performed by: STUDENT IN AN ORGANIZED HEALTH CARE EDUCATION/TRAINING PROGRAM

## 2021-07-24 PROCEDURE — 85610 PROTHROMBIN TIME: CPT | Performed by: INTERNAL MEDICINE

## 2021-07-24 RX ORDER — SPIRONOLACTONE 25 MG/1
25 TABLET ORAL DAILY
Status: DISCONTINUED | OUTPATIENT
Start: 2021-07-24 | End: 2021-07-27 | Stop reason: HOSPADM

## 2021-07-24 RX ORDER — SODIUM CHLORIDE 0.9 % (FLUSH) 0.9 %
10 SYRINGE (ML) INJECTION EVERY 12 HOURS SCHEDULED
Status: DISCONTINUED | OUTPATIENT
Start: 2021-07-24 | End: 2021-07-27 | Stop reason: HOSPADM

## 2021-07-24 RX ORDER — SODIUM CHLORIDE 0.9 % (FLUSH) 0.9 %
10 SYRINGE (ML) INJECTION AS NEEDED
Status: DISCONTINUED | OUTPATIENT
Start: 2021-07-24 | End: 2021-07-27 | Stop reason: HOSPADM

## 2021-07-24 RX ORDER — FUROSEMIDE 10 MG/ML
40 INJECTION INTRAMUSCULAR; INTRAVENOUS DAILY
Status: DISCONTINUED | OUTPATIENT
Start: 2021-07-25 | End: 2021-07-26

## 2021-07-24 RX ORDER — GABAPENTIN 300 MG/1
300 CAPSULE ORAL 3 TIMES DAILY
Status: DISCONTINUED | OUTPATIENT
Start: 2021-07-24 | End: 2021-07-24

## 2021-07-24 RX ORDER — WARFARIN SODIUM 5 MG/1
5 TABLET ORAL
Status: DISCONTINUED | OUTPATIENT
Start: 2021-07-24 | End: 2021-07-26

## 2021-07-24 RX ORDER — MONTELUKAST SODIUM 10 MG/1
10 TABLET ORAL NIGHTLY
Status: DISCONTINUED | OUTPATIENT
Start: 2021-07-24 | End: 2021-07-27 | Stop reason: HOSPADM

## 2021-07-24 RX ORDER — PANTOPRAZOLE SODIUM 40 MG/10ML
40 INJECTION, POWDER, LYOPHILIZED, FOR SOLUTION INTRAVENOUS ONCE
Status: COMPLETED | OUTPATIENT
Start: 2021-07-25 | End: 2021-07-24

## 2021-07-24 RX ORDER — WARFARIN SODIUM 2.5 MG/1
2.5 TABLET ORAL
Status: DISCONTINUED | OUTPATIENT
Start: 2021-07-26 | End: 2021-07-26

## 2021-07-24 RX ORDER — ACETAMINOPHEN 325 MG/1
650 TABLET ORAL EVERY 4 HOURS PRN
Status: DISCONTINUED | OUTPATIENT
Start: 2021-07-24 | End: 2021-07-27 | Stop reason: HOSPADM

## 2021-07-24 RX ORDER — GABAPENTIN 300 MG/1
300 CAPSULE ORAL NIGHTLY
Status: DISCONTINUED | OUTPATIENT
Start: 2021-07-24 | End: 2021-07-27 | Stop reason: HOSPADM

## 2021-07-24 RX ORDER — NICOTINE POLACRILEX 4 MG
15 LOZENGE BUCCAL
Status: DISCONTINUED | OUTPATIENT
Start: 2021-07-24 | End: 2021-07-27 | Stop reason: HOSPADM

## 2021-07-24 RX ORDER — LEVOTHYROXINE SODIUM 0.12 MG/1
125 TABLET ORAL DAILY
Status: DISCONTINUED | OUTPATIENT
Start: 2021-07-24 | End: 2021-07-27 | Stop reason: HOSPADM

## 2021-07-24 RX ORDER — ASPIRIN 81 MG/1
81 TABLET ORAL DAILY
Status: DISCONTINUED | OUTPATIENT
Start: 2021-07-24 | End: 2021-07-27 | Stop reason: HOSPADM

## 2021-07-24 RX ORDER — HYDROCODONE BITARTRATE AND ACETAMINOPHEN 7.5; 325 MG/1; MG/1
1 TABLET ORAL EVERY 6 HOURS PRN
Status: DISCONTINUED | OUTPATIENT
Start: 2021-07-24 | End: 2021-07-27 | Stop reason: HOSPADM

## 2021-07-24 RX ORDER — ATORVASTATIN CALCIUM 20 MG/1
20 TABLET, FILM COATED ORAL DAILY
Status: DISCONTINUED | OUTPATIENT
Start: 2021-07-24 | End: 2021-07-27 | Stop reason: HOSPADM

## 2021-07-24 RX ORDER — CARVEDILOL 12.5 MG/1
12.5 TABLET ORAL 2 TIMES DAILY WITH MEALS
Status: DISCONTINUED | OUTPATIENT
Start: 2021-07-24 | End: 2021-07-27 | Stop reason: HOSPADM

## 2021-07-24 RX ORDER — DEXTROSE MONOHYDRATE 25 G/50ML
25 INJECTION, SOLUTION INTRAVENOUS
Status: DISCONTINUED | OUTPATIENT
Start: 2021-07-24 | End: 2021-07-27 | Stop reason: HOSPADM

## 2021-07-24 RX ORDER — HYDROXYZINE HYDROCHLORIDE 25 MG/1
50 TABLET, FILM COATED ORAL EVERY 6 HOURS PRN
Status: DISCONTINUED | OUTPATIENT
Start: 2021-07-24 | End: 2021-07-27 | Stop reason: HOSPADM

## 2021-07-24 RX ORDER — FUROSEMIDE 10 MG/ML
40 INJECTION INTRAMUSCULAR; INTRAVENOUS ONCE
Status: COMPLETED | OUTPATIENT
Start: 2021-07-24 | End: 2021-07-24

## 2021-07-24 RX ADMIN — HYDROXYZINE HYDROCHLORIDE 50 MG: 25 TABLET, FILM COATED ORAL at 18:54

## 2021-07-24 RX ADMIN — GABAPENTIN 300 MG: 300 CAPSULE ORAL at 21:21

## 2021-07-24 RX ADMIN — SODIUM CHLORIDE, PRESERVATIVE FREE 10 ML: 5 INJECTION INTRAVENOUS at 21:22

## 2021-07-24 RX ADMIN — INSULIN DETEMIR 20 UNITS: 100 INJECTION, SOLUTION SUBCUTANEOUS at 21:21

## 2021-07-24 RX ADMIN — WARFARIN SODIUM 5 MG: 5 TABLET ORAL at 17:49

## 2021-07-24 RX ADMIN — SPIRONOLACTONE 25 MG: 25 TABLET ORAL at 16:56

## 2021-07-24 RX ADMIN — PANTOPRAZOLE SODIUM 40 MG: 40 INJECTION, POWDER, FOR SOLUTION INTRAVENOUS at 23:19

## 2021-07-24 RX ADMIN — LIDOCAINE HYDROCHLORIDE: 20 SOLUTION ORAL; TOPICAL at 23:22

## 2021-07-24 RX ADMIN — CARVEDILOL 12.5 MG: 12.5 TABLET, FILM COATED ORAL at 17:50

## 2021-07-24 RX ADMIN — FUROSEMIDE 40 MG: 10 INJECTION, SOLUTION INTRAVENOUS at 09:28

## 2021-07-24 RX ADMIN — INSULIN LISPRO 2 UNITS: 100 INJECTION, SOLUTION INTRAVENOUS; SUBCUTANEOUS at 17:13

## 2021-07-24 RX ADMIN — ATORVASTATIN CALCIUM 20 MG: 20 TABLET, FILM COATED ORAL at 21:21

## 2021-07-24 RX ADMIN — MONTELUKAST SODIUM 10 MG: 10 TABLET, COATED ORAL at 21:21

## 2021-07-24 RX ADMIN — ASPIRIN 81 MG: 81 TABLET, COATED ORAL at 16:56

## 2021-07-25 LAB
ANION GAP SERPL CALCULATED.3IONS-SCNC: 8 MMOL/L (ref 5–15)
BUN SERPL-MCNC: 20 MG/DL (ref 8–23)
BUN/CREAT SERPL: 15 (ref 7–25)
CALCIUM SPEC-SCNC: 9.3 MG/DL (ref 8.6–10.5)
CHLORIDE SERPL-SCNC: 100 MMOL/L (ref 98–107)
CO2 SERPL-SCNC: 28 MMOL/L (ref 22–29)
CREAT SERPL-MCNC: 1.33 MG/DL (ref 0.76–1.27)
GFR SERPL CREATININE-BSD FRML MDRD: 52 ML/MIN/1.73
GLUCOSE BLDC GLUCOMTR-MCNC: 109 MG/DL (ref 70–130)
GLUCOSE BLDC GLUCOMTR-MCNC: 112 MG/DL (ref 70–130)
GLUCOSE BLDC GLUCOMTR-MCNC: 140 MG/DL (ref 70–130)
GLUCOSE BLDC GLUCOMTR-MCNC: 151 MG/DL (ref 70–130)
GLUCOSE SERPL-MCNC: 123 MG/DL (ref 65–99)
INR PPP: 1.95 (ref 0.85–1.16)
POTASSIUM SERPL-SCNC: 4.3 MMOL/L (ref 3.5–5.2)
PROTHROMBIN TIME: 21.4 SECONDS (ref 11.4–14.4)
QT INTERVAL: 382 MS
QTC INTERVAL: 480 MS
SARS-COV-2 RNA NOSE QL NAA+PROBE: NOT DETECTED
SODIUM SERPL-SCNC: 136 MMOL/L (ref 136–145)

## 2021-07-25 PROCEDURE — G0378 HOSPITAL OBSERVATION PER HR: HCPCS

## 2021-07-25 PROCEDURE — 99225 PR SBSQ OBSERVATION CARE/DAY 25 MINUTES: CPT | Performed by: INTERNAL MEDICINE

## 2021-07-25 PROCEDURE — 63710000001 INSULIN DETEMIR PER 5 UNITS: Performed by: INTERNAL MEDICINE

## 2021-07-25 PROCEDURE — 96376 TX/PRO/DX INJ SAME DRUG ADON: CPT

## 2021-07-25 PROCEDURE — 25010000002 FUROSEMIDE PER 20 MG: Performed by: INTERNAL MEDICINE

## 2021-07-25 PROCEDURE — 99214 OFFICE O/P EST MOD 30 MIN: CPT | Performed by: INTERNAL MEDICINE

## 2021-07-25 PROCEDURE — 82962 GLUCOSE BLOOD TEST: CPT

## 2021-07-25 PROCEDURE — U0004 COV-19 TEST NON-CDC HGH THRU: HCPCS | Performed by: INTERNAL MEDICINE

## 2021-07-25 PROCEDURE — 25010000002 FUROSEMIDE PER 20 MG: Performed by: PHYSICIAN ASSISTANT

## 2021-07-25 PROCEDURE — 80048 BASIC METABOLIC PNL TOTAL CA: CPT | Performed by: PHYSICIAN ASSISTANT

## 2021-07-25 PROCEDURE — 85610 PROTHROMBIN TIME: CPT | Performed by: INTERNAL MEDICINE

## 2021-07-25 RX ORDER — FUROSEMIDE 10 MG/ML
40 INJECTION INTRAMUSCULAR; INTRAVENOUS ONCE
Status: COMPLETED | OUTPATIENT
Start: 2021-07-25 | End: 2021-07-25

## 2021-07-25 RX ORDER — GABAPENTIN 300 MG/1
300 CAPSULE ORAL ONCE
Status: COMPLETED | OUTPATIENT
Start: 2021-07-25 | End: 2021-07-25

## 2021-07-25 RX ADMIN — CARVEDILOL 12.5 MG: 12.5 TABLET, FILM COATED ORAL at 08:57

## 2021-07-25 RX ADMIN — GABAPENTIN 300 MG: 300 CAPSULE ORAL at 21:05

## 2021-07-25 RX ADMIN — FUROSEMIDE 40 MG: 10 INJECTION INTRAMUSCULAR; INTRAVENOUS at 08:57

## 2021-07-25 RX ADMIN — LEVOTHYROXINE SODIUM 125 MCG: 125 TABLET ORAL at 05:30

## 2021-07-25 RX ADMIN — HYDROXYZINE HYDROCHLORIDE 50 MG: 25 TABLET, FILM COATED ORAL at 21:05

## 2021-07-25 RX ADMIN — FUROSEMIDE 40 MG: 10 INJECTION, SOLUTION INTRAVENOUS at 13:58

## 2021-07-25 RX ADMIN — ATORVASTATIN CALCIUM 20 MG: 20 TABLET, FILM COATED ORAL at 21:05

## 2021-07-25 RX ADMIN — SPIRONOLACTONE 25 MG: 25 TABLET ORAL at 09:03

## 2021-07-25 RX ADMIN — SODIUM CHLORIDE, PRESERVATIVE FREE 10 ML: 5 INJECTION INTRAVENOUS at 08:56

## 2021-07-25 RX ADMIN — MONTELUKAST SODIUM 10 MG: 10 TABLET, COATED ORAL at 21:05

## 2021-07-25 RX ADMIN — HYDROCODONE BITARTRATE AND ACETAMINOPHEN 1 TABLET: 7.5; 325 TABLET ORAL at 23:13

## 2021-07-25 RX ADMIN — SODIUM CHLORIDE, PRESERVATIVE FREE 10 ML: 5 INJECTION INTRAVENOUS at 21:05

## 2021-07-25 RX ADMIN — CARVEDILOL 12.5 MG: 12.5 TABLET, FILM COATED ORAL at 18:38

## 2021-07-25 RX ADMIN — ASPIRIN 81 MG: 81 TABLET, COATED ORAL at 08:57

## 2021-07-25 RX ADMIN — INSULIN DETEMIR 20 UNITS: 100 INJECTION, SOLUTION SUBCUTANEOUS at 21:04

## 2021-07-25 RX ADMIN — WARFARIN SODIUM 5 MG: 5 TABLET ORAL at 18:38

## 2021-07-25 RX ADMIN — GABAPENTIN 300 MG: 300 CAPSULE ORAL at 23:13

## 2021-07-26 LAB
ANION GAP SERPL CALCULATED.3IONS-SCNC: 9 MMOL/L (ref 5–15)
BUN SERPL-MCNC: 20 MG/DL (ref 8–23)
BUN/CREAT SERPL: 16.5 (ref 7–25)
CALCIUM SPEC-SCNC: 9.2 MG/DL (ref 8.6–10.5)
CHLORIDE SERPL-SCNC: 101 MMOL/L (ref 98–107)
CO2 SERPL-SCNC: 27 MMOL/L (ref 22–29)
CREAT SERPL-MCNC: 1.21 MG/DL (ref 0.76–1.27)
GFR SERPL CREATININE-BSD FRML MDRD: 58 ML/MIN/1.73
GLUCOSE BLDC GLUCOMTR-MCNC: 128 MG/DL (ref 70–130)
GLUCOSE BLDC GLUCOMTR-MCNC: 140 MG/DL (ref 70–130)
GLUCOSE BLDC GLUCOMTR-MCNC: 163 MG/DL (ref 70–130)
GLUCOSE BLDC GLUCOMTR-MCNC: 65 MG/DL (ref 70–130)
GLUCOSE SERPL-MCNC: 105 MG/DL (ref 65–99)
INR PPP: 1.96 (ref 0.85–1.16)
POTASSIUM SERPL-SCNC: 3.8 MMOL/L (ref 3.5–5.2)
PROTHROMBIN TIME: 21.6 SECONDS (ref 11.4–14.4)
SODIUM SERPL-SCNC: 137 MMOL/L (ref 136–145)

## 2021-07-26 PROCEDURE — 82962 GLUCOSE BLOOD TEST: CPT

## 2021-07-26 PROCEDURE — 99225 PR SBSQ OBSERVATION CARE/DAY 25 MINUTES: CPT | Performed by: INTERNAL MEDICINE

## 2021-07-26 PROCEDURE — 85610 PROTHROMBIN TIME: CPT | Performed by: INTERNAL MEDICINE

## 2021-07-26 PROCEDURE — 99213 OFFICE O/P EST LOW 20 MIN: CPT | Performed by: INTERNAL MEDICINE

## 2021-07-26 PROCEDURE — 80048 BASIC METABOLIC PNL TOTAL CA: CPT | Performed by: INTERNAL MEDICINE

## 2021-07-26 PROCEDURE — G0378 HOSPITAL OBSERVATION PER HR: HCPCS

## 2021-07-26 PROCEDURE — 63710000001 INSULIN DETEMIR PER 5 UNITS: Performed by: INTERNAL MEDICINE

## 2021-07-26 RX ORDER — BUMETANIDE 2 MG/1
2 TABLET ORAL DAILY
Status: DISCONTINUED | OUTPATIENT
Start: 2021-07-26 | End: 2021-07-27 | Stop reason: HOSPADM

## 2021-07-26 RX ORDER — WARFARIN SODIUM 5 MG/1
5 TABLET ORAL
Status: DISCONTINUED | OUTPATIENT
Start: 2021-07-26 | End: 2021-07-27 | Stop reason: HOSPADM

## 2021-07-26 RX ORDER — GABAPENTIN 300 MG/1
300 CAPSULE ORAL ONCE
Status: COMPLETED | OUTPATIENT
Start: 2021-07-26 | End: 2021-07-26

## 2021-07-26 RX ADMIN — CARVEDILOL 12.5 MG: 12.5 TABLET, FILM COATED ORAL at 08:08

## 2021-07-26 RX ADMIN — SODIUM CHLORIDE, PRESERVATIVE FREE 10 ML: 5 INJECTION INTRAVENOUS at 20:40

## 2021-07-26 RX ADMIN — SPIRONOLACTONE 25 MG: 25 TABLET ORAL at 08:08

## 2021-07-26 RX ADMIN — INSULIN DETEMIR 20 UNITS: 100 INJECTION, SOLUTION SUBCUTANEOUS at 20:39

## 2021-07-26 RX ADMIN — MONTELUKAST SODIUM 10 MG: 10 TABLET, COATED ORAL at 20:39

## 2021-07-26 RX ADMIN — ATORVASTATIN CALCIUM 20 MG: 20 TABLET, FILM COATED ORAL at 20:39

## 2021-07-26 RX ADMIN — LEVOTHYROXINE SODIUM 125 MCG: 125 TABLET ORAL at 05:11

## 2021-07-26 RX ADMIN — HYDROXYZINE HYDROCHLORIDE 50 MG: 25 TABLET, FILM COATED ORAL at 20:39

## 2021-07-26 RX ADMIN — ASPIRIN 81 MG: 81 TABLET, COATED ORAL at 08:08

## 2021-07-26 RX ADMIN — BUMETANIDE 2 MG: 2 TABLET ORAL at 08:07

## 2021-07-26 RX ADMIN — HYDROCODONE BITARTRATE AND ACETAMINOPHEN 1 TABLET: 7.5; 325 TABLET ORAL at 18:09

## 2021-07-26 RX ADMIN — SODIUM CHLORIDE, PRESERVATIVE FREE 10 ML: 5 INJECTION INTRAVENOUS at 08:09

## 2021-07-26 RX ADMIN — CARVEDILOL 12.5 MG: 12.5 TABLET, FILM COATED ORAL at 17:43

## 2021-07-26 RX ADMIN — GABAPENTIN 300 MG: 300 CAPSULE ORAL at 20:39

## 2021-07-26 RX ADMIN — WARFARIN SODIUM 5 MG: 5 TABLET ORAL at 17:43

## 2021-07-26 RX ADMIN — GABAPENTIN 300 MG: 300 CAPSULE ORAL at 20:40

## 2021-07-27 VITALS
BODY MASS INDEX: 31.23 KG/M2 | DIASTOLIC BLOOD PRESSURE: 75 MMHG | WEIGHT: 251.2 LBS | HEART RATE: 88 BPM | SYSTOLIC BLOOD PRESSURE: 108 MMHG | HEIGHT: 75 IN | TEMPERATURE: 96.5 F | OXYGEN SATURATION: 96 % | RESPIRATION RATE: 17 BRPM

## 2021-07-27 PROBLEM — I50.9 ACUTE ON CHRONIC CONGESTIVE HEART FAILURE (HCC): Status: RESOLVED | Noted: 2021-06-28 | Resolved: 2021-07-27

## 2021-07-27 LAB
ANION GAP SERPL CALCULATED.3IONS-SCNC: 10 MMOL/L (ref 5–15)
BUN SERPL-MCNC: 21 MG/DL (ref 8–23)
BUN/CREAT SERPL: 18.1 (ref 7–25)
CALCIUM SPEC-SCNC: 9.4 MG/DL (ref 8.6–10.5)
CHLORIDE SERPL-SCNC: 102 MMOL/L (ref 98–107)
CO2 SERPL-SCNC: 29 MMOL/L (ref 22–29)
CREAT SERPL-MCNC: 1.16 MG/DL (ref 0.76–1.27)
GFR SERPL CREATININE-BSD FRML MDRD: 61 ML/MIN/1.73
GLUCOSE BLDC GLUCOMTR-MCNC: 131 MG/DL (ref 70–130)
GLUCOSE BLDC GLUCOMTR-MCNC: 70 MG/DL (ref 70–130)
GLUCOSE SERPL-MCNC: 93 MG/DL (ref 65–99)
INR PPP: 1.8 (ref 0.85–1.16)
POTASSIUM SERPL-SCNC: 4.2 MMOL/L (ref 3.5–5.2)
PROTHROMBIN TIME: 20.2 SECONDS (ref 11.4–14.4)
SODIUM SERPL-SCNC: 141 MMOL/L (ref 136–145)

## 2021-07-27 PROCEDURE — 82962 GLUCOSE BLOOD TEST: CPT

## 2021-07-27 PROCEDURE — 99213 OFFICE O/P EST LOW 20 MIN: CPT | Performed by: INTERNAL MEDICINE

## 2021-07-27 PROCEDURE — 80048 BASIC METABOLIC PNL TOTAL CA: CPT | Performed by: INTERNAL MEDICINE

## 2021-07-27 PROCEDURE — G0378 HOSPITAL OBSERVATION PER HR: HCPCS

## 2021-07-27 PROCEDURE — 85610 PROTHROMBIN TIME: CPT | Performed by: INTERNAL MEDICINE

## 2021-07-27 PROCEDURE — 99217 PR OBSERVATION CARE DISCHARGE MANAGEMENT: CPT | Performed by: INTERNAL MEDICINE

## 2021-07-27 RX ORDER — MONTELUKAST SODIUM 10 MG/1
10 TABLET ORAL NIGHTLY
Start: 2021-07-27

## 2021-07-27 RX ADMIN — CARVEDILOL 12.5 MG: 12.5 TABLET, FILM COATED ORAL at 08:31

## 2021-07-27 RX ADMIN — ASPIRIN 81 MG: 81 TABLET, COATED ORAL at 08:31

## 2021-07-27 RX ADMIN — BUMETANIDE 2 MG: 2 TABLET ORAL at 08:31

## 2021-07-27 RX ADMIN — HYDROCODONE BITARTRATE AND ACETAMINOPHEN 1 TABLET: 7.5; 325 TABLET ORAL at 10:30

## 2021-07-27 RX ADMIN — LEVOTHYROXINE SODIUM 125 MCG: 125 TABLET ORAL at 05:32

## 2021-07-27 RX ADMIN — SPIRONOLACTONE 25 MG: 25 TABLET ORAL at 08:31

## 2021-07-27 RX ADMIN — SODIUM CHLORIDE, PRESERVATIVE FREE 10 ML: 5 INJECTION INTRAVENOUS at 10:30

## 2021-07-28 ENCOUNTER — READMISSION MANAGEMENT (OUTPATIENT)
Dept: CALL CENTER | Facility: HOSPITAL | Age: 76
End: 2021-07-28

## 2021-07-28 NOTE — OUTREACH NOTE
Prep Survey      Responses   [facility] facility patient discharged from?  [place]   Is LACE score < 7 ?  No   Emergency Room discharge w/ pulse ox?  No   Eligibility  Readm Mgmt   Discharge diagnosis  Acute on chronic congestive heart failure    Does the patient have one of the following disease processes/diagnoses(primary or secondary)?  CHF   Does the patient have Home health ordered?  No   Is there a DME ordered?  No   Prep survey completed?  Yes          Alysia Montgomery RN

## 2021-08-02 ENCOUNTER — READMISSION MANAGEMENT (OUTPATIENT)
Dept: CALL CENTER | Facility: HOSPITAL | Age: 76
End: 2021-08-02

## 2021-08-02 NOTE — OUTREACH NOTE
CHF Week 1 Survey      Responses   [facility] patient discharged from?  [place]   Does the patient have one of the following disease processes/diagnoses(primary or secondary)?  CHF   CHF Week 1 attempt successful?  No   Call start time  1637   Unsuccessful attempts  Attempt 1   Discharge diagnosis  Acute on chronic congestive heart failure           Aysha Zarco RN

## 2021-08-09 ENCOUNTER — READMISSION MANAGEMENT (OUTPATIENT)
Dept: CALL CENTER | Facility: HOSPITAL | Age: 76
End: 2021-08-09

## 2021-08-09 NOTE — OUTREACH NOTE
CHF Week 2 Survey      Responses   [facility] ([place] patient discharged from?  [place]   Does the patient have one of the following disease processes/diagnoses(primary or secondary)?  CHF   Week 2 attempt successful?  Yes   Call start time  1502   Call end time  1505   Is patient permission given to speak with other caregiver?  Yes   List who call center can speak with  spouse   Is the patient taking all medications as directed (includes completed medication regime)?  Yes   Comments regarding appointments  Pt saw pcp on this day.   Has the patient kept scheduled appointments due by today?  Yes   Has home health visited the patient within 72 hours of discharge?  N/A   What is the patient's perception of their health status since discharge?  Improving   CHF Week 2 call completed?  Yes   Wrap up additional comments  Pt's spouse reports pt feeling much better. Pt followed up with pcp this pm.          Claudia Mathew RN

## 2021-08-31 ENCOUNTER — TELEPHONE (OUTPATIENT)
Dept: CARDIOLOGY | Facility: HOSPITAL | Age: 76
End: 2021-08-31

## 2021-08-31 NOTE — TELEPHONE ENCOUNTER
Patients friend called and stated the patients BP has been running low. Patients friend stated BP today was 91/53 HR: 101 and yesterday BP 95/69 HR: 103. Patients friend stated since the patients BP is low she has only been giving him 0.5 tablet carvedilol, patients friend stated she would like to know how to continue giving the patient his carvedilol if his BP is low.